# Patient Record
Sex: FEMALE | Race: WHITE | NOT HISPANIC OR LATINO | Employment: FULL TIME | URBAN - METROPOLITAN AREA
[De-identification: names, ages, dates, MRNs, and addresses within clinical notes are randomized per-mention and may not be internally consistent; named-entity substitution may affect disease eponyms.]

---

## 2022-01-04 ENCOUNTER — TRANSFERRED RECORDS (OUTPATIENT)
Dept: MULTI SPECIALTY CLINIC | Facility: CLINIC | Age: 27
End: 2022-01-04

## 2022-01-04 LAB — PAP-ABSTRACT: NORMAL

## 2022-06-20 ENCOUNTER — OFFICE VISIT (OUTPATIENT)
Dept: FAMILY MEDICINE | Facility: OTHER | Age: 27
End: 2022-06-20

## 2022-06-20 VITALS
BODY MASS INDEX: 30.26 KG/M2 | RESPIRATION RATE: 16 BRPM | HEART RATE: 84 BPM | SYSTOLIC BLOOD PRESSURE: 110 MMHG | TEMPERATURE: 97.5 F | HEIGHT: 63 IN | WEIGHT: 170.8 LBS | OXYGEN SATURATION: 97 % | DIASTOLIC BLOOD PRESSURE: 70 MMHG

## 2022-06-20 DIAGNOSIS — K52.9 GASTROENTERITIS: Primary | ICD-10-CM

## 2022-06-20 PROCEDURE — 99202 OFFICE O/P NEW SF 15 MIN: CPT | Performed by: STUDENT IN AN ORGANIZED HEALTH CARE EDUCATION/TRAINING PROGRAM

## 2022-06-20 ASSESSMENT — PAIN SCALES - GENERAL: PAINLEVEL: NO PAIN (0)

## 2022-06-20 NOTE — LETTER
[facility]  [address]  [place] [address]  Phone: [phone]    June 20, 2022        Kayy Jenkins  [address]   [place] [address]          To whom it may concern:    RE: Kayy Jenkins    Patient was seen today at our clinic. She may be excused from missing work on the dates of 6/14/2022 to 6/17/2022. Patient may return to work Monday, 6/20/2022 with no restrictions.    Please contact me for questions or concerns.      Sincerely,        ALINE RODRÍGUEZ MD

## 2022-06-20 NOTE — LETTER
[facility]  [address]  [place] [address]  Phone: [phone]    June 20, 2022        Kayy Jenkins  [address]   [place] [address]          To whom it may concern:    RE: Kayy Jenkins    Patient was seen today at our clinic. She may be excuse from missing work on the dates of 6/14/2022 to 6/17/2022. Patient may return to work Monday, 6/20/2022 with no restrictions.    Please contact me for questions or concerns.      Sincerely,        ALINE RODRÍGUEZ MD

## 2022-06-20 NOTE — PROGRESS NOTES
"  Assessment & Plan       ICD-10-CM    1. Gastroenteritis  K52.9      1.  Patient is a 27-year-old female presents for a work note as she missed work on 6/14/2022 to 6/17/2022 for gastroenteritis.  Her symptoms of abdominal cramping, diarrhea, diminished appetite, and headaches have improved.  She is requesting to go back to work today.  Today she appears well, has been tolerating food/fluids, and is afebrile.  Discussed food safety and symptoms that would warrant the patient to return.  Work note was provided to the patient.  Patient understood and verbally consented to the treatment plan.  Notify provider if questions or concerns arise.    BMI:   Estimated body mass index is 30.08 kg/m  as calculated from the following:    Height as of this encounter: 1.605 m (5' 3.19\").    Weight as of this encounter: 77.5 kg (170 lb 12.8 oz).     Follow-up if symptoms return or if new symptoms arise.    ALINE RODRÍGUEZ MD  [facility]    GiselaEVELINE Cool-S[address]  [facility]   Kayy is a 27 year old, presenting for the following health issues:  Note for return to work     Patient presents for a follow-up on her food poisoning illness and would like a note to return to work.  She is feeling well today with no fevers, cramping, headache, or diarrhea.  She has been staying well-hydrated with water and Gatorade and is tolerating food and fluids well.  On Monday 6/13 patient ate a burger for dinner that was undercooked.  She woke up with nausea, diarrhea, and an episode of vomiting on Tuesday 6/14 and called into work that day.  She had no appetite on Monday and Tuesday.  On Wednesday 6/15 she continued to have persistent watery diarrhea, intermittent abdominal cramping, and a migraine.  She checked her temperature and had no fevers during her illness.  On Thursday 6/16 she reports that she started to feel better and her symptoms decreased and fully resolved.  She has recovered from her " "food poisoning illness and is requesting a work note to return to work today.    History of Present Illness       Reason for visit:  General check up and doctors note    She eats 0-1 servings of fruits and vegetables daily.She consumes 1 sweetened beverage(s) daily.She exercises with enough effort to increase her heart rate 10 to 19 minutes per day.  She exercises with enough effort to increase her heart rate 4 days per week.   She is taking medications regularly.       Food poisoning, Needs doctors note sayings she is good to go back to work.    Review of Systems   Constitutional, HEENT, cardiovascular, pulmonary, gi and gu systems are negative, except as otherwise noted.      Objective    /70 (Cuff Size: Adult Regular)   Pulse 84   Temp 97.5  F (36.4  C) (Temporal)   Resp 16   Ht 1.605 m (5' 3.19\")   Wt 77.5 kg (170 lb 12.8 oz)   LMP 06/11/2022 (Approximate)   SpO2 97%   BMI 30.08 kg/m    Body mass index is 30.08 kg/m .  Physical Exam   GENERAL: healthy, alert and no distress  EYES: Eyes grossly normal to inspection  MS: no gross musculoskeletal defects noted, no edema  SKIN: no suspicious lesions or rashes  PSYCH: mentation appears normal, affect normal/bright              .  ..  "

## 2023-03-09 ENCOUNTER — OFFICE VISIT (OUTPATIENT)
Dept: FAMILY MEDICINE | Facility: OTHER | Age: 28
End: 2023-03-09
Payer: COMMERCIAL

## 2023-03-09 VITALS
DIASTOLIC BLOOD PRESSURE: 70 MMHG | SYSTOLIC BLOOD PRESSURE: 120 MMHG | WEIGHT: 170 LBS | HEIGHT: 64 IN | OXYGEN SATURATION: 97 % | HEART RATE: 89 BPM | BODY MASS INDEX: 29.02 KG/M2 | TEMPERATURE: 97.2 F | RESPIRATION RATE: 22 BRPM

## 2023-03-09 DIAGNOSIS — R21 RASH AND NONSPECIFIC SKIN ERUPTION: Primary | ICD-10-CM

## 2023-03-09 PROCEDURE — 99213 OFFICE O/P EST LOW 20 MIN: CPT | Performed by: PHYSICIAN ASSISTANT

## 2023-03-09 RX ORDER — RIZATRIPTAN BENZOATE 10 MG/1
TABLET, ORALLY DISINTEGRATING ORAL
COMMUNITY
Start: 2022-07-01 | End: 2023-03-09

## 2023-03-09 RX ORDER — TRIAMCINOLONE ACETONIDE 1 MG/G
CREAM TOPICAL 2 TIMES DAILY
Qty: 30 G | Refills: 0 | Status: SHIPPED | OUTPATIENT
Start: 2023-03-09 | End: 2023-04-27

## 2023-03-09 RX ORDER — AMITRIPTYLINE HYDROCHLORIDE 10 MG/1
TABLET ORAL
COMMUNITY
Start: 2022-03-29 | End: 2023-03-09

## 2023-03-09 RX ORDER — TOPIRAMATE 25 MG/1
1 TABLET, FILM COATED ORAL
COMMUNITY
Start: 2022-09-26 | End: 2023-03-09

## 2023-03-09 ASSESSMENT — PAIN SCALES - GENERAL: PAINLEVEL: WORST PAIN (10)

## 2023-03-09 NOTE — PROGRESS NOTES
"  Assessment & Plan     Rash and nonspecific skin eruption  Patient has pruritic papular lesion along the lateral right breast. 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 I discussed good skin cares with the patient and will have her use triamcinolone up to twice daily as needed for next 1-2 weeks. Reference material attached to the AVS.   - triamcinolone (KENALOG) 0.1 % external cream; Apply topically 2 times daily    Return in about 4 months (around 7/9/2023) for Return for scheduled annual checkup with PCP.    JOCY Morejon [facility] ENEDELIA Sultana is a 27 year old, presenting for the following health issues:  Breast Problem (Bumps and itching)      History of Present Illness       Reason for visit:  Bumps/itchy breasts  Symptom onset:  3-4 weeks ago  Symptoms include:  Itching  Symptom intensity:  Moderate  Symptom progression:  Staying the same  Had these symptoms before:  No    She eats 2-3 servings of fruits and vegetables daily.She consumes 1 sweetened beverage(s) daily.She exercises with enough effort to increase her heart rate 20 to 29 minutes per day.  She exercises with enough effort to increase her heart rate 5 days per week.   She is taking medications regularly.     Review of Systems   Constitutional, HEENT, cardiovascular, pulmonary, gi and gu systems are negative, except as otherwise noted.      Objective    /70 (BP Location: Left arm, Patient Position: Sitting, Cuff Size: Adult Regular)   Pulse 89   Temp 97.2  F (36.2  C) (Temporal)   Resp 22   Ht 5' 3.9\" (1.623 m)   Wt 170 lb (77.1 kg)   SpO2 97%   BMI 29.27 kg/m    Body mass index is 29.27 kg/m .  Physical Exam   GENERAL: healthy, alert and no distress  RESP: lungs clear to auscultation - no rales, rhonchi or wheezes  BREAST: normal without masses, tenderness or nipple discharge, 1-2mm " erythematous papular lesion along the right lateral breast  CV: regular rate and rhythm, normal S1 S2, no S3 or S4, no murmur, click or rub, no peripheral edema and peripheral pulses strong  PSYCH: mentation appears normal, affect normal/bright        MA Nelly was present in room during all sensitive exams.

## 2023-04-27 ENCOUNTER — OFFICE VISIT (OUTPATIENT)
Dept: FAMILY MEDICINE | Facility: OTHER | Age: 28
End: 2023-04-27
Payer: COMMERCIAL

## 2023-04-27 VITALS
HEART RATE: 78 BPM | DIASTOLIC BLOOD PRESSURE: 74 MMHG | WEIGHT: 201 LBS | TEMPERATURE: 97.9 F | OXYGEN SATURATION: 96 % | HEIGHT: 62 IN | BODY MASS INDEX: 36.99 KG/M2 | RESPIRATION RATE: 22 BRPM | SYSTOLIC BLOOD PRESSURE: 116 MMHG

## 2023-04-27 DIAGNOSIS — Z00.00 ROUTINE GENERAL MEDICAL EXAMINATION AT A HEALTH CARE FACILITY: Primary | ICD-10-CM

## 2023-04-27 DIAGNOSIS — R07.89 ATYPICAL CHEST PAIN: ICD-10-CM

## 2023-04-27 DIAGNOSIS — F32.2 CURRENT SEVERE EPISODE OF MAJOR DEPRESSIVE DISORDER WITHOUT PSYCHOTIC FEATURES, UNSPECIFIED WHETHER RECURRENT (H): ICD-10-CM

## 2023-04-27 DIAGNOSIS — K21.9 GASTROESOPHAGEAL REFLUX DISEASE WITHOUT ESOPHAGITIS: ICD-10-CM

## 2023-04-27 DIAGNOSIS — R40.0 DAYTIME SLEEPINESS: ICD-10-CM

## 2023-04-27 DIAGNOSIS — R35.0 URINARY FREQUENCY: ICD-10-CM

## 2023-04-27 DIAGNOSIS — R45.851 SUICIDAL IDEATION: ICD-10-CM

## 2023-04-27 LAB
ALBUMIN UR-MCNC: NEGATIVE MG/DL
ANION GAP SERPL CALCULATED.3IONS-SCNC: 8 MMOL/L (ref 7–15)
APPEARANCE UR: CLEAR
BILIRUB UR QL STRIP: NEGATIVE
BUN SERPL-MCNC: 11.2 MG/DL (ref 6–20)
CALCIUM SERPL-MCNC: 9 MG/DL (ref 8.6–10)
CHLORIDE SERPL-SCNC: 105 MMOL/L (ref 98–107)
CLUE CELLS: PRESENT
COLOR UR AUTO: YELLOW
CREAT SERPL-MCNC: 0.63 MG/DL (ref 0.51–0.95)
DEPRECATED HCO3 PLAS-SCNC: 27 MMOL/L (ref 22–29)
ERYTHROCYTE [DISTWIDTH] IN BLOOD BY AUTOMATED COUNT: 13.7 % (ref 10–15)
GFR SERPL CREATININE-BSD FRML MDRD: >90 ML/MIN/1.73M2
GLUCOSE SERPL-MCNC: 112 MG/DL (ref 70–99)
GLUCOSE UR STRIP-MCNC: NEGATIVE MG/DL
HCT VFR BLD AUTO: 39 % (ref 35–47)
HGB BLD-MCNC: 13.1 G/DL (ref 11.7–15.7)
HGB UR QL STRIP: NEGATIVE
KETONES UR STRIP-MCNC: NEGATIVE MG/DL
LEUKOCYTE ESTERASE UR QL STRIP: NEGATIVE
MCH RBC QN AUTO: 30.4 PG (ref 26.5–33)
MCHC RBC AUTO-ENTMCNC: 33.6 G/DL (ref 31.5–36.5)
MCV RBC AUTO: 91 FL (ref 78–100)
NITRATE UR QL: NEGATIVE
PH UR STRIP: 7.5 [PH] (ref 5–7)
PLATELET # BLD AUTO: 305 10E3/UL (ref 150–450)
POTASSIUM SERPL-SCNC: 4.2 MMOL/L (ref 3.4–5.3)
RBC # BLD AUTO: 4.31 10E6/UL (ref 3.8–5.2)
SODIUM SERPL-SCNC: 140 MMOL/L (ref 136–145)
SP GR UR STRIP: 1.02 (ref 1–1.03)
TRICHOMONAS, WET PREP: ABNORMAL
TSH SERPL DL<=0.005 MIU/L-ACNC: 2.17 UIU/ML (ref 0.3–4.2)
UROBILINOGEN UR STRIP-ACNC: 0.2 E.U./DL
WBC # BLD AUTO: 6.7 10E3/UL (ref 4–11)
WBC'S/HIGH POWER FIELD, WET PREP: ABNORMAL
YEAST, WET PREP: ABNORMAL

## 2023-04-27 PROCEDURE — 87210 SMEAR WET MOUNT SALINE/INK: CPT | Performed by: PHYSICIAN ASSISTANT

## 2023-04-27 PROCEDURE — 99395 PREV VISIT EST AGE 18-39: CPT | Performed by: PHYSICIAN ASSISTANT

## 2023-04-27 PROCEDURE — 99214 OFFICE O/P EST MOD 30 MIN: CPT | Mod: 25 | Performed by: PHYSICIAN ASSISTANT

## 2023-04-27 PROCEDURE — 36415 COLL VENOUS BLD VENIPUNCTURE: CPT | Performed by: PHYSICIAN ASSISTANT

## 2023-04-27 PROCEDURE — 80048 BASIC METABOLIC PNL TOTAL CA: CPT | Performed by: PHYSICIAN ASSISTANT

## 2023-04-27 PROCEDURE — 81003 URINALYSIS AUTO W/O SCOPE: CPT | Performed by: PHYSICIAN ASSISTANT

## 2023-04-27 PROCEDURE — 84443 ASSAY THYROID STIM HORMONE: CPT | Performed by: PHYSICIAN ASSISTANT

## 2023-04-27 PROCEDURE — 85027 COMPLETE CBC AUTOMATED: CPT | Performed by: PHYSICIAN ASSISTANT

## 2023-04-27 ASSESSMENT — ENCOUNTER SYMPTOMS
NERVOUS/ANXIOUS: 1
FEVER: 0
DIZZINESS: 1
DIARRHEA: 0
ARTHRALGIAS: 0
ABDOMINAL PAIN: 0
JOINT SWELLING: 0
MYALGIAS: 0
NAUSEA: 1
PALPITATIONS: 0
SHORTNESS OF BREATH: 0
SORE THROAT: 0
DYSURIA: 0
HEARTBURN: 1
COUGH: 0
CONSTIPATION: 0
PARESTHESIAS: 0
FREQUENCY: 1
HEADACHES: 1
WEAKNESS: 1
EYE PAIN: 0
HEMATOCHEZIA: 0
HEMATURIA: 0
CHILLS: 0

## 2023-04-27 ASSESSMENT — PATIENT HEALTH QUESTIONNAIRE - PHQ9
SUM OF ALL RESPONSES TO PHQ QUESTIONS 1-9: 20
SUM OF ALL RESPONSES TO PHQ QUESTIONS 1-9: 20
10. IF YOU CHECKED OFF ANY PROBLEMS, HOW DIFFICULT HAVE THESE PROBLEMS MADE IT FOR YOU TO DO YOUR WORK, TAKE CARE OF THINGS AT HOME, OR GET ALONG WITH OTHER PEOPLE: NOT DIFFICULT AT ALL

## 2023-04-27 ASSESSMENT — PAIN SCALES - GENERAL: PAINLEVEL: SEVERE PAIN (6)

## 2023-04-27 NOTE — PATIENT INSTRUCTIONS
Preventive Health Recommendations  Female Ages 26 - 39  Yearly exam:   See your health care provider every year in order to  Review health changes.   Discuss preventive care.    Review your medicines if you your doctor has prescribed any.    Until age 30: Get a Pap test every three years (more often if you have had an abnormal result).    After age 30: Talk to your doctor about whether you should have a Pap test every 3 years or have a Pap test with HPV screening every 5 years.   You do not need a Pap test if your uterus was removed (hysterectomy) and you have not had cancer.  You should be tested each year for STDs (sexually transmitted diseases), if you're at risk.   Talk to your provider about how often to have your cholesterol checked.  If you are at risk for diabetes, you should have a diabetes test (fasting glucose).  Shots: Get a flu shot each year. Get a tetanus shot every 10 years.   Nutrition:   Eat at least 5 servings of fruits and vegetables each day.  Eat whole-grain bread, whole-wheat pasta and brown rice instead of white grains and rice.  Get adequate Calcium and Vitamin D.     Lifestyle  Exercise at least 150 minutes a week (30 minutes a day, 5 days of the week). This will help you control your weight and prevent disease.  Limit alcohol to one drink per day.  No smoking.   Wear sunscreen to prevent skin cancer.  See your dentist every six months for an exam and cleaning.    Medications which could help with headaches  Effexor  Topamax  Propranolol    Medications which help with headaches and mental health  Effexor (+)   Fluoxetine (+/-)  Duloxetine (+/-)

## 2023-04-27 NOTE — PROGRESS NOTES
SUBJECTIVE:   CC: Kayy is an 28 year old who presents for preventive health visit.       4/27/2023     3:55 PM   Additional Questions   Roomed by Sherron VARNER   Accompanied by self   Patient has been advised of split billing requirements and indicates understanding: Yes  Healthy Habits:     Getting at least 3 servings of Calcium per day:  Yes    Bi-annual eye exam:  NO    Dental care twice a year:  Yes    Sleep apnea or symptoms of sleep apnea:  Daytime drowsiness    Diet:  Regular (no restrictions)    Frequency of exercise:  2-3 days/week    Duration of exercise:  Less than 15 minutes    Taking medications regularly:  Yes    Medication side effects:  Not applicable    PHQ-2 Total Score: 6    Additional concerns today:  No    (Z00.00) Routine general medical examination at a health care facility  (primary encounter diagnosis)  Comment: Patient arrived with several additional concerns which she would like to address today. Reviewed health maintenance.   Plan: Basic metabolic panel  (Ca, Cl, CO2, Creat,         Gluc, K, Na, BUN), CBC with platelets, TSH with        free T4 reflex    (R07.89) Atypical chest pain  (K21.9) Gastroesophageal reflux disease without esophagitis  Comment: Patient reports sporadic, brief chest pains which occur at night while laying or at rest. She has a history of reflux which is managed with OTC prilosec and avoiding trigger foods such as greasy and/or tomato based foods. Exam unremarkable today.   Plan: Patient given education on causes for atypical chest pains including reflux as well as alarm symptoms to monitor for. She will seek emergent care if these occurs.     (R35.0) Urinary frequency  Comment: Labs returned positive for clue cells. Patient will be started on oral flagyl. She will reach out if not improving as expected.   Plan: UA Macroscopic with reflex to Microscopic and         Culture, Wet prep - lab collect, TSH with free         T4 reflex    (R40.0) Daytime  sleepiness  Comment: Patient reports daytime sleepiness for some time. She has received feedback from several family members that she should undergo sleep study. Her dentist recently provided her similar feedback. She is agreeable to scheduling this now. Order placed.   Plan: Adult Sleep Eval & Management          Referral          (F32.2) Current severe episode of major depressive disorder without psychotic features, unspecified whether recurrent (H)  (R48.455) Suicidal ideation  Comment: She reports severe depression with thoughts of wanting to hurt herself off/on. She denies urges to act on these thoughts and cites family and friends as motivation to stay safe. Patient informs me that she has struggled with mental health for several years. She recalls that she had taken medication for this in the past and did not tolerate these medications. We discussed trying alternatives such as Prozac. Reviewed the MOA, adverse effects and timeframe for benefit. She was not interested in a treatment such as this today. She would prefer to meet with a counselor instead. If this is too costly we can revisit medication. Depression action plan reviewed with the patient and copy sent home with AVS.   Plan: TSH with free T4 reflex, Adult Sleep Eval &         Management  Referral, Adult Mental         Health  Referral  Plan: Adult Mental Health  Referral            Answers for HPI/ROS submitted by the patient on 4/27/2023  If you checked off any problems, how difficult have these problems made it for you to do your work, take care of things at home, or get along with other people?: Not difficult at all  PHQ9 TOTAL SCORE: 20    Today's PHQ-2 Score:       4/27/2023     3:41 PM   PHQ-2 ( 1999 [employer])   Q1: Little interest or pleasure in doing things 3   Q2: Feeling down, depressed or hopeless 3   PHQ-2 Score 6   Q1: Little interest or pleasure in doing things Nearly every day   Q2: Feeling down,  Vascular & Interventional Radiology Post-Procedure Note    Pre-Procedure Diagnosis: Pelvic collection  Post-Procedure Diagnosis: Same as pre.  Indications for Procedure: Culture and source control    Attending: Dr. Goncalves  Resident: Dr. Luna    Procedure Details/Findings: Successful 10.2 Fr drainage catheter placement within a pelvic collection  Access (if applicable): NA    Complications: None  Estimated Blood Loss: Minimal  Specimen: 45 cc purulent yellowish fluid  Contrast: None  Sedation: IV sedation by anesthesiology  Patient Condition/Disposition: Stable/Recovery then to floor    Plan:  1.) Keep drain to bulb suction  2.) Monitor drain output  3.) Flush catheter with 5 cc normal saline once daily  4.) Follow-up culture  5.) Keep dressing clean and dry depressed or hopeless Nearly every day   PHQ-2 Score 6       Have you ever done Advance Care Planning? (For example, a Health Directive, POLST, or a discussion with a medical provider or your loved ones about your wishes): No, advance care planning information given to patient to review.  Advanced care planning was discussed at today's visit.    Social History     Tobacco Use     Smoking status: Never     Smokeless tobacco: Never   Vaping Use     Vaping status: Never Used   Substance Use Topics     Alcohol use: Yes         4/27/2023     3:41 PM   Alcohol Use   Prescreen: >3 drinks/day or >7 drinks/week? No     Reviewed orders with patient.  Reviewed health maintenance and updated orders accordingly - Yes  Lab work is in process    Breast Cancer Screening:    FHS-7:       4/27/2023     3:41 PM   Breast CA Risk Assessment (FHS-7)   Did any of your first-degree relatives have breast or ovarian cancer? Yes   Did any of your relatives have bilateral breast cancer? Unknown   Did any man in your family have breast cancer? Unknown   Did any woman in your family have breast and ovarian cancer? Yes   Did any woman in your family have breast cancer before age 50 y? Unknown   Do you have 2 or more relatives with breast and/or ovarian cancer? Unknown   Do you have 2 or more relatives with breast and/or bowel cancer? Unknown       Patient under 40 years of age: Routine Mammogram Screening not recommended.   Pertinent mammograms are reviewed under the imaging tab.    History of abnormal Pap smear: Discussed pap today. Patient would prefer to wait until next annual checkup and plans to see a female provider.     Reviewed and updated as needed this visit by clinical staff   Tobacco  Allergies  Meds              Reviewed and updated as needed this visit by Provider      Review of Systems   Constitutional: Negative for chills and fever.   HENT: Positive for congestion. Negative for ear pain, hearing loss and sore throat.    Eyes:  "Negative for pain and visual disturbance.   Respiratory: Negative for cough and shortness of breath.    Cardiovascular: Positive for chest pain. Negative for palpitations and peripheral edema.   Gastrointestinal: Positive for heartburn and nausea. Negative for abdominal pain, constipation, diarrhea and hematochezia.   Genitourinary: Positive for frequency and urgency. Negative for dysuria, genital sores and hematuria.   Musculoskeletal: Negative for arthralgias, joint swelling and myalgias.   Skin: Negative for rash.   Neurological: Positive for dizziness, weakness and headaches. Negative for paresthesias.   Psychiatric/Behavioral: Negative for mood changes. The patient is nervous/anxious.       OBJECTIVE:   /74   Pulse 78   Temp 97.9  F (36.6  C) (Temporal)   Resp 22   Ht 1.585 m (5' 2.4\")   Wt 91.2 kg (201 lb)   LMP 04/17/2023   SpO2 96%   BMI 36.29 kg/m    Physical Exam  GENERAL: healthy, alert and no distress  EYES: Eyes grossly normal to inspection, PERRL and conjunctivae and sclerae normal  HENT: ear canals and TM's normal, nose and mouth without ulcers or lesions  NECK: no adenopathy, no asymmetry, masses, or scars and thyroid normal to palpation  RESP: lungs clear to auscultation - no rales, rhonchi or wheezes  CV: regular rate and rhythm, normal S1 S2, no S3 or S4, no murmur, click or rub, no peripheral edema and peripheral pulses strong  ABDOMEN: soft, nontender, no hepatosplenomegaly, no masses and bowel sounds normal  MS: no gross musculoskeletal defects noted, no edema  NEURO: Normal strength and tone, mentation intact and speech normal  PSYCH: mentation appears normal, affect normal/bright    Diagnostic Test Results:  Results for orders placed or performed in visit on 04/27/23 (from the past 24 hour(s))   CBC with platelets   Result Value Ref Range    WBC Count 6.7 4.0 - 11.0 10e3/uL    RBC Count 4.31 3.80 - 5.20 10e6/uL    Hemoglobin 13.1 11.7 - 15.7 g/dL    Hematocrit 39.0 35.0 - 47.0 % "    MCV 91 78 - 100 fL    MCH 30.4 26.5 - 33.0 pg    MCHC 33.6 31.5 - 36.5 g/dL    RDW 13.7 10.0 - 15.0 %    Platelet Count 305 150 - 450 10e3/uL   Wet prep - lab collect    Specimen: Vagina; Swab   Result Value Ref Range    Trichomonas Absent Absent    Yeast Absent Absent    Clue Cells Present (A) Absent    WBCs/high power field 1+ (A) None   UA Macroscopic with reflex to Microscopic and Culture    Specimen: Urine, Clean Catch   Result Value Ref Range    Color Urine Yellow Colorless, Straw, Light Yellow, Yellow    Appearance Urine Clear Clear    Glucose Urine Negative Negative mg/dL    Bilirubin Urine Negative Negative    Ketones Urine Negative Negative mg/dL    Specific Gravity Urine 1.020 1.003 - 1.035    Blood Urine Negative Negative    pH Urine 7.5 (H) 5.0 - 7.0    Protein Albumin Urine Negative Negative mg/dL    Urobilinogen Urine 0.2 0.2, 1.0 E.U./dL    Nitrite Urine Negative Negative    Leukocyte Esterase Urine Negative Negative    Narrative    Microscopic not indicated       ASSESSMENT/PLAN:       ICD-10-CM    1. Routine general medical examination at a health care facility  Z00.00 Basic metabolic panel  (Ca, Cl, CO2, Creat, Gluc, K, Na, BUN)     CBC with platelets     TSH with free T4 reflex     TSH with free T4 reflex     CBC with platelets     Basic metabolic panel  (Ca, Cl, CO2, Creat, Gluc, K, Na, BUN)      2. Atypical chest pain  R07.89       3. Gastroesophageal reflux disease without esophagitis  K21.9       4. Urinary frequency  R35.0 UA Macroscopic with reflex to Microscopic and Culture     Wet prep - lab collect     TSH with free T4 reflex     TSH with free T4 reflex     Wet prep - lab collect     UA Macroscopic with reflex to Microscopic and Culture      5. Daytime sleepiness  R40.0 Adult Sleep Eval & Management  Referral      6. Current severe episode of major depressive disorder without psychotic features, unspecified whether recurrent (H)  F32.2 TSH with free T4 reflex     Adult Sleep  "Eval & Management  Referral     Adult Mental Health  Referral     TSH with free T4 reflex      7. Suicidal ideation  R45.851 Adult Mental Health  Referral          Patient has been advised of split billing requirements and indicates understanding: Yes      COUNSELING:  Reviewed preventive health counseling, as reflected in patient instructions       Regular exercise       Healthy diet/nutrition       Alcohol Use       Pap screen - patient declined today. I instructed her to schedule with female provider for next annual checkup to have this done.       BMI:   Estimated body mass index is 36.29 kg/m  as calculated from the following:    Height as of this encounter: 1.585 m (5' 2.4\").    Weight as of this encounter: 91.2 kg (201 lb).   Weight management plan: Discussed healthy diet and exercise guidelines      She reports that she has never smoked. She has never used smokeless tobacco.    Rolando Jain PA-C  [facility]        "

## 2023-04-27 NOTE — LETTER
My Depression Action Plan  Name: Kayy Jenkins   Date of Birth 1995  Date: 4/27/2023    My doctor: Rolando Jain   My clinic: [address]  [facility] [address]  [phone]            GREEN    ZONE   Good Control    What it looks like:   Things are going generally well. You have normal ups and downs. You may even feel depressed from time to time, but bad moods usually last less than a day.   What you need to do:  Continue to care for yourself (see self care plan)  Check your depression survival kit and update it as needed  Follow your physician s recommendations including any medication.  Do not stop taking medication unless you consult with your physician first.             YELLOW         ZONE Getting Worse    What it looks like:   Depression is starting to interfere with your life.   It may be hard to get out of bed; you may be starting to isolate yourself from others.  Symptoms of depression are starting to last most all day and this has happened for several days.   You may have suicidal thoughts but they are not constant.   What you need to do:     Call your care team. Your response to treatment will improve if you keep your care team informed of your progress. Yellow periods are signs an adjustment may need to be made.     Continue your self-care.  Just get dressed and ready for the day.  Don't give yourself time to talk yourself out of it.    Talk to someone in your support network.    Open up your Depression Self-Care Plan/Wellness Kit.             RED    ZONE Medical Alert - Get Help    What it looks like:   Depression is seriously interfering with your life.   You may experience these or other symptoms: You can t get out of bed most days, can t work or engage in other necessary activities, you have trouble taking care of basic hygiene, or basic responsibilities, thoughts of suicide or death that will not go away,  self-injurious behavior.     What you need to do:  Call your care team and request a same-day appointment. If they are not available (weekends or after hours) call your local crisis line, emergency room or 911.          Depression Self-Care Plan / Wellness Kit    Many people find that medication and therapy are helpful treatments for managing depression. In addition, making small changes to your everyday life can help to boost your mood and improve your wellbeing. Below are some tips for you to consider. Be sure to talk with your medical provider and/or behavioral health consultant if your symptoms are worsening or not improving.     Sleep   Sleep hygiene  means all of the habits that support good, restful sleep. It includes maintaining a consistent bedtime and wake time, using your bedroom only for sleeping or sex, and keeping the bedroom dark and free of distractions like a computer, smartphone, or television.     Develop a Healthy Routine  Maintain good hygiene. Get out of bed in the morning, make your bed, brush your teeth, take a shower, and get dressed. Don t spend too much time viewing media that makes you feel stressed. Find time to relax each day.    Exercise  Get some form of exercise every day. This will help reduce pain and release endorphins, the  feel good  chemicals in your brain. It can be as simple as just going for a walk or doing some gardening, anything that will get you moving.      Diet  Strive to eat healthy foods, including fruits and vegetables. Drink plenty of water. Avoid excessive sugar, caffeine, alcohol, and other mood-altering substances.     Stay Connected with Others  Stay in touch with friends and family members.    Manage Your Mood  Try deep breathing, massage therapy, biofeedback, or meditation. Take part in fun activities when you can. Try to find something to smile about each day.     Psychotherapy  Be open to working with a therapist if your provider recommends it.      Medication  Be sure to take your medication as prescribed. Most anti-depressants need to be taken every day. It usually takes several weeks for medications to work. Not all medicines work for all people. It is important to follow-up with your provider to make sure you have a treatment plan that is working for you. Do not stop your medication abruptly without first discussing it with your provider.    Crisis Resources   These hotlines are for both adults and children. They and are open 24 hours a day, 7 days a week unless noted otherwise.    National Suicide Prevention Lifeline   988 or 1-007-563-APZN (3076)    Crisis Text Line    www.crisistextline.org  Text HOME to 544244 from anywhere in the [place], anytime, about any type of crisis. A live, trained crisis counselor will receive the text and respond quickly.    [facility] Lifeline for LGBTQ Youth  A national crisis intervention and suicide lifeline for LGBTQ youth under 25. Provides a safe place to talk without judgement. Call 1-534.853.6507; text START to 608341 or visit www.thetrevorproject.org to talk to a trained counselor.    For [facility] crisis numbers, visit the [facility] website at:  https://[place].gov/dhs/people-we-serve/adults/health-care/mental-health/resources/crisis-contacts.jsp

## 2023-04-28 ENCOUNTER — TELEPHONE (OUTPATIENT)
Dept: FAMILY MEDICINE | Facility: OTHER | Age: 28
End: 2023-04-28
Payer: COMMERCIAL

## 2023-04-28 DIAGNOSIS — N76.0 BV (BACTERIAL VAGINOSIS): Primary | ICD-10-CM

## 2023-04-28 DIAGNOSIS — B96.89 BV (BACTERIAL VAGINOSIS): Primary | ICD-10-CM

## 2023-04-28 RX ORDER — METRONIDAZOLE 500 MG/1
500 TABLET ORAL 2 TIMES DAILY
Qty: 14 TABLET | Refills: 0 | Status: SHIPPED | OUTPATIENT
Start: 2023-04-28 | End: 2023-05-05

## 2023-04-28 NOTE — TELEPHONE ENCOUNTER
Kayy calling and asking about her lab results.  Did read to her the message written by Rolando Jain on her lab results.    Let her know her script was sent to her pharmacy.    She also asked for the number to deal with mychart so number was given.    Petra Hoff RN  [facility] ~ Registered Nurse  Clinic Triage ~ [facility]  April 28, 2023

## 2023-04-28 NOTE — TELEPHONE ENCOUNTER
----- Message from Rolando Jain PA-C sent at 4/28/2023 11:00 AM CDT -----  Please call patient to inform her that the wet prep did return positive for bacterial vaginosis. I have sent out an antibiotic for her to use twice daily for the next week. This antibiotic is processed through the liver, so she should avoid alcohol consumption while taking it. It can also cause a metallic taste in the mouth which, if it occurs, goes away after course is completed. It can also increased the skin's sensitivity to the sun, so she should wear sunscreen on ignacia days.     The metabolic panel, which evaluates your kidney function, electrolytes and blood sugar, returned grossly normal. The thyroid was within normal range. The cell counts did not show any evidence of anemia, infection or other abnormality. She can continue with the plan to schedule with our counseling service. If she would like to start a medication she can reach out to let me know.    Rolando Jain PA-C on 4/28/2023 at 11:00 AM

## 2023-04-28 NOTE — RESULT ENCOUNTER NOTE
Please call patient to inform her that the wet prep did return positive for bacterial vaginosis. I have sent out an antibiotic for her to use twice daily for the next week. This antibiotic is processed through the liver, so she should avoid alcohol consumption while taking it. It can also cause a metallic taste in the mouth which, if it occurs, goes away after course is completed. It can also increased the skin's sensitivity to the sun, so she should wear sunscreen on ignacia days.     The metabolic panel, which evaluates your kidney function, electrolytes and blood sugar, returned grossly normal. The thyroid was within normal range. The cell counts did not show any evidence of anemia, infection or other abnormality. She can continue with the plan to schedule with our counseling service. If she would like to start a medication she can reach out to let me know.    Rolando Jain PA-C on 4/28/2023 at 11:00 AM

## 2023-04-28 NOTE — TELEPHONE ENCOUNTER
Attempted to call patient with the following message below. Left a voicemail for the patient to call the clinic back.    Corrine Caba MA

## 2023-05-09 ENCOUNTER — TELEPHONE (OUTPATIENT)
Dept: FAMILY MEDICINE | Facility: OTHER | Age: 28
End: 2023-05-09
Payer: COMMERCIAL

## 2023-05-09 NOTE — TELEPHONE ENCOUNTER
General Call      Reason for Call:  Question about referral    What are your questions or concerns:    Patient got referred to mental health within the [facility] but patient would like to know if she can be referred somewhere else in [place]. [facility] only wants to go there if primary doctor will still work with her and her therapist out of the [facility] system      Date of last appointment with provider: n/a    Okay to leave a detailed message?: Yes at Cell number on file:    Telephone Information:   Mobile [phone]

## 2023-05-11 ENCOUNTER — NURSE TRIAGE (OUTPATIENT)
Dept: NURSING | Facility: CLINIC | Age: 28
End: 2023-05-11
Payer: COMMERCIAL

## 2023-05-11 NOTE — TELEPHONE ENCOUNTER
Patient calling back after missing the call yesterday.  Writer julia transferred patient to Yovani Krishnamurthy RN.    Fang Devlin RN  [facility]

## 2023-05-11 NOTE — TELEPHONE ENCOUNTER
FYI:  Patient called back.  She is wanting GERALD Jain PA-C to know that she made an appointment within [facility] for behavioral health but it is in [place] which she states is too far to travel.  She checked with her insurance and was told that [facility] has a provider within her insurance network and she does not need a referral to see her; Jolanta Annie psychotherapist.  She is going to call and set up an appointment with her if she is taking new patients; if not, she will keep appointment in [facility] within [facility].  She just wanted to make sure she could still see you for her other health care needs if she was seen outside [facility] for behavioral health.  I assured her she can still be seen for her physicals, illnesses, etc with provider within [facility].  Rakel JOHNSON RN

## 2023-05-17 ENCOUNTER — HOSPITAL ENCOUNTER (OUTPATIENT)
Dept: BEHAVIORAL HEALTH | Facility: CLINIC | Age: 28
Discharge: HOME | End: 2023-05-17
Attending: FAMILY MEDICINE
Payer: COMMERCIAL

## 2023-05-17 DIAGNOSIS — F43.10 POSTTRAUMATIC STRESS DISORDER: ICD-10-CM

## 2023-05-17 DIAGNOSIS — F60.3 BORDERLINE PERSONALITY DISORDER (H): ICD-10-CM

## 2023-05-17 DIAGNOSIS — F33.41 RECURRENT MAJOR DEPRESSIVE DISORDER, IN PARTIAL REMISSION (H): ICD-10-CM

## 2023-05-17 DIAGNOSIS — F41.9 ANXIETY: ICD-10-CM

## 2023-05-17 PROBLEM — F32.9 MDD (MAJOR DEPRESSIVE DISORDER): Status: ACTIVE | Noted: 2023-05-17

## 2023-05-17 PROCEDURE — 999N000216 HC STATISTIC ADULT CD FACE TO FACE-NO CHRG: Performed by: COUNSELOR

## 2023-05-17 ASSESSMENT — COLUMBIA-SUICIDE SEVERITY RATING SCALE - C-SSRS
4. HAVE YOU HAD THESE THOUGHTS AND HAD SOME INTENTION OF ACTING ON THEM?: NO
5. HAVE YOU STARTED TO WORK OUT OR WORKED OUT THE DETAILS OF HOW TO KILL YOURSELF? DO YOU INTEND TO CARRY OUT THIS PLAN?: NO
1. IN THE PAST MONTH, HAVE YOU WISHED YOU WERE DEAD OR WISHED YOU COULD GO TO SLEEP AND NOT WAKE UP?: YES
6. HAVE YOU EVER DONE ANYTHING, STARTED TO DO ANYTHING, OR PREPARED TO DO ANYTHING TO END YOUR LIFE?: NO
3. HAVE YOU BEEN THINKING ABOUT HOW YOU MIGHT KILL YOURSELF?: YES
2. HAVE YOU ACTUALLY HAD ANY THOUGHTS OF KILLING YOURSELF IN THE PAST MONTH?: YES

## 2023-05-17 ASSESSMENT — ANXIETY QUESTIONNAIRES
GAD7 TOTAL SCORE: 3
7. FEELING AFRAID AS IF SOMETHING AWFUL MIGHT HAPPEN: NOT AT ALL
5. BEING SO RESTLESS THAT IT IS HARD TO SIT STILL: NOT AT ALL
4. TROUBLE RELAXING: NOT AT ALL
2. NOT BEING ABLE TO STOP OR CONTROL WORRYING: NOT AT ALL
GAD7 TOTAL SCORE: 3
3. WORRYING TOO MUCH ABOUT DIFFERENT THINGS: SEVERAL DAYS
6. BECOMING EASILY ANNOYED OR IRRITABLE: SEVERAL DAYS
1. FEELING NERVOUS, ANXIOUS, OR ON EDGE: SEVERAL DAYS

## 2023-05-17 ASSESSMENT — PATIENT HEALTH QUESTIONNAIRE - PHQ9: SUM OF ALL RESPONSES TO PHQ QUESTIONS 1-9: 3

## 2023-05-17 NOTE — PATIENT INSTRUCTIONS
"  Referral:   Juanjo Osullivan  Virtual Appointment  Date: 06/14/2023   Time: 8:00am   Provider: Kayy Castanon    Follow-up  Date: 06/21/2023  Time: 8:00am       Nicho Jaeger [facility],  May 17, 2023                         [facility] [place]    Outpatient Mental Health Services - Adult Safety Plan      PATIENT'S NAME: Kayy Jenkins  MRN:   [id number]    Step 1: Warning signs / cues (Thoughts, images, mood, situation, behavior) that a crisis may be developing:  Thoughts: \"I don't matter\", \"People would be better off without me\", \"I'm a burden\" and \"I can't do this anymore\"  Images: obsessive thoughts of death or dying and flashbacks  Thinking Processes: ruminations (can't stop thinking about my problems), racing thoughts and intrusive thoughts (bothersome, unwanted thoughts that come out of nowhere)  Mood: worsening depression, hopelessness, helplessness, agitation and mood swings  Behaviors: isolating/withdrawing  and impulsive, reckless behaviors (acting without thinking)  Situations: changes in symptoms     Step 2: Coping strategies - Things I can do to take my mind off of my problems without contacting another person (relaxation technique, physical activity):  Distress Tolerance Strategies:  arts and crafts, play with my pet  and watch a funny movie  Physical Activities: go for a walk  Focus on helpful thoughts:  \"This is temporary\", \"I will get through this\", \"It always passes\" and \"Ride the wave\"    Step 3: People and social settings that provide distraction:   Name: my job    music and coloring      Step 4: Remind myself of people and things that are important to me and worth living for:  Family, pets and  friends      Step 5: When I am in crisis, I can ask these people to help me use my safety plan:   Name: Hailee Jenkins (grandparent) Phone: [phone]              Name: Yannick Sheikh (signficant other)  Phone: [phone]       Step 6: Making " the environment safe:   remove things I could use to hurt myself and be around others    Step 7: Professionals or agencies I can contact during a crisis:    Suicide Prevention Lifeline: 9-767-422-TALK (3637)  Crisis Text Line Service (available 24 hours a day, 7 days a week): Text [place] to 128310  Call  **CRISIS (273156) from a cell phone to talk to a team of professionals who can help you.    Crisis Services By [facility]: Phone Number:   [place]     [phone]   [place]    [phone]   [facility]    [phone]   [place]    [phone]   [place]    [phone]   [place] [phone]   [place]     [phone]     Call 911 or go to my nearest emergency department.     I helped develop this safety plan and agree to use it when needed.  I have been given a copy of this plan.      Completed by Provider Name/ Credentials:  Nicho Jaeger, [facility]  Today s date:  5/17/2023    Kayy Jenkins waspchandlervided a copy of this Safety Plan.    Adapted from Safety Plan Template 2008 Niya Kidd and Delroy Frey is reprinted with the express permission of the authors.  No portion of the Safety Plan Template may be reproduced without the express, written permission.  You can contact the authors at [email or web address] or [email or web address]
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
## 2023-05-28 ENCOUNTER — MYC MEDICAL ADVICE (OUTPATIENT)
Dept: FAMILY MEDICINE | Facility: OTHER | Age: 28
End: 2023-05-28
Payer: COMMERCIAL

## 2023-05-30 NOTE — TELEPHONE ENCOUNTER
Patient returned call. RN read the message sent via [facility]. Informed the patient to schedule a visit. If her symptoms worsen then she needs to go into the emergency room.     NIECY Kauffman, RN, PHN  [place] [place]  May 30, 2023

## 2023-06-06 ENCOUNTER — VIRTUAL VISIT (OUTPATIENT)
Dept: FAMILY MEDICINE | Facility: OTHER | Age: 28
End: 2023-06-06
Payer: COMMERCIAL

## 2023-06-06 DIAGNOSIS — R35.0 URINARY FREQUENCY: Primary | ICD-10-CM

## 2023-06-06 DIAGNOSIS — G43.909 MIGRAINE WITHOUT STATUS MIGRAINOSUS, NOT INTRACTABLE, UNSPECIFIED MIGRAINE TYPE: ICD-10-CM

## 2023-06-06 PROCEDURE — 99214 OFFICE O/P EST MOD 30 MIN: CPT | Mod: VID | Performed by: PHYSICIAN ASSISTANT

## 2023-06-06 NOTE — LETTER
June 6, 2023      Kayy PUTNAM Post [address]   [facility] [address]        To Whom It May Concern,       Kayy was seen virtually today, June 6, 2023, for follow up on polyuria/frequent urination. The cause for her symptoms is currently under evaluation and she has been referred to specialist. While we are working out this issue for her, I would appreciate if she would be allowed extra restroom use during her shift, at least one use per hour.        Sincerely,        Rolando Jain PA-C

## 2023-06-06 NOTE — PROGRESS NOTES
Kayy is a 28 year old who is being evaluated via a billable video visit.      How would you like to obtain your AVS? MyChart  If the video visit is dropped, the invitation should be resent by: Text to cell phone: [phone]  Will anyone else be joining your video visit? No    Assessment & Plan     Urinary frequency  Migraine without status migrainosus, not intractable, unspecified migraine type  Patient has been struggling with urinary frequency for several months. She had mentioned this at the time of her previous annual preventative in April 2023. Review of the workup at that visit was positive for clue cells and patient was treated with flagyl. Unfortunately, symptoms have persisted and she is having to use restroom frequently at her place of employment to the extent that her employer has asked her to get a medical note. She informs me that she is drinking fluids regularly to as this helps to reduce frequency of migraines. She is working to restrict fluid intake to 24oz while at work and 24-30oz outside of work. She had been working with neurologist in the past, but has not granted release of these records and thus I cannot see what has been done. We discussed checking A1c to rule out diabetes as cause for her frequent urination/thirst. I will have her meet with urologist/gynecologist. I did offer preventative migraine treatment such as nurtec or effexor. She declined at this time.   - Adult Uro/Gyn  Referral; Future  - Hemoglobin A1c; Future    JOCY [facility]   Kayy is a 28 year old, presenting for the following health issues:  Form Request (Doctors note for work) and LAB REQUEST        6/6/2023     7:58 AM   Additional Questions   Roomed by Donis HILL   Accompanied by Self         6/6/2023     7:58 AM   Patient Reported Additional Medications   Patient reports taking the following new medications None     History of Present Illness        Reason for visit:  I need a doctor's note for work, i also need to schedule an in person appointment and get some tests done    She eats 2-3 servings of fruits and vegetables daily.She consumes 1 sweetened beverage(s) daily.She exercises with enough effort to increase her heart rate 10 to 19 minutes per day.  She exercises with enough effort to increase her heart rate 5 days per week.   She is taking medications regularly.     [email or web address]    Review of Systems   Constitutional, HEENT, cardiovascular, pulmonary, gi and gu systems are negative, except as otherwise noted.      Objective           Vitals:  No vitals were obtained today due to virtual visit.    Physical Exam   GENERAL: Healthy, alert and no distress  EYES: Eyes grossly normal to inspection.  No discharge or erythema, or obvious scleral/conjunctival abnormalities.  RESP: No audible wheeze, cough, or visible cyanosis.  No visible retractions or increased work of breathing.    SKIN: Visible skin clear. No significant rash, abnormal pigmentation or lesions.  NEURO: Cranial nerves grossly intact.  Mentation and speech appropriate for age.  PSYCH: Mentation appears normal, affect normal/bright, judgement and insight intact, normal speech and appearance well-groomed.            Video-Visit Details    Type of service:  Video Visit     Originating Location (pt. Location): Home    Joined the call at 6/6/2023, 5:14:33?pm.  Left the call at 6/6/2023, 5:32:19?pm.  You were on the call for 17 minutes 45 seconds .    Distant Location (provider location):  On-site  Platform used for Video Visit: Danyelle

## 2023-06-06 NOTE — PATIENT INSTRUCTIONS
Rolando Jain PA-C   [address] [place] [address]   Phone: [phone]   Fax: [phone]    Diagnoses: Urinary frequency   Order: Adult Uro/Gyn  Referral       Comment: Please be aware that coverage of these services is subject to the terms and limitations of your health insurance plan.  Call member services at your health plan with any benefit or coverage questions.

## 2023-06-07 DIAGNOSIS — G43.909 MIGRAINE WITHOUT STATUS MIGRAINOSUS, NOT INTRACTABLE, UNSPECIFIED MIGRAINE TYPE: Primary | ICD-10-CM

## 2023-06-13 ENCOUNTER — LAB (OUTPATIENT)
Dept: LAB | Facility: OTHER | Age: 28
End: 2023-06-13
Payer: COMMERCIAL

## 2023-06-13 DIAGNOSIS — R35.0 URINARY FREQUENCY: ICD-10-CM

## 2023-06-13 LAB — HBA1C MFR BLD: 5 % (ref 0–5.6)

## 2023-06-13 PROCEDURE — 36415 COLL VENOUS BLD VENIPUNCTURE: CPT

## 2023-06-13 PROCEDURE — 83036 HEMOGLOBIN GLYCOSYLATED A1C: CPT

## 2023-06-20 ENCOUNTER — TELEPHONE (OUTPATIENT)
Dept: FAMILY MEDICINE | Facility: OTHER | Age: 28
End: 2023-06-20

## 2023-06-20 ENCOUNTER — VIRTUAL VISIT (OUTPATIENT)
Dept: FAMILY MEDICINE | Facility: OTHER | Age: 28
End: 2023-06-20
Payer: COMMERCIAL

## 2023-06-20 DIAGNOSIS — R11.0 NAUSEA: ICD-10-CM

## 2023-06-20 DIAGNOSIS — R19.7 DIARRHEA, UNSPECIFIED TYPE: Primary | ICD-10-CM

## 2023-06-20 PROCEDURE — 99213 OFFICE O/P EST LOW 20 MIN: CPT | Mod: VID | Performed by: PHYSICIAN ASSISTANT

## 2023-06-20 RX ORDER — LOPERAMIDE HYDROCHLORIDE 2 MG/1
2 TABLET ORAL 4 TIMES DAILY PRN
Qty: 30 TABLET | Refills: 0 | Status: SHIPPED | OUTPATIENT
Start: 2023-06-20 | End: 2024-03-11

## 2023-06-20 NOTE — PROGRESS NOTES
Kayy is a 28 year old who is being evaluated via a billable video visit.      How would you like to obtain your AVS? MyChart  If the video visit is dropped, the invitation should be resent by: Text to cell phone: [phone]  Will anyone else be joining your video visit? No      Assessment & Plan     Diarrhea, unspecified type  Nausea  Discussed with the patient that her symptoms are consistent with viral illness. She said that several coworkers have had similar symptoms. I advised that she complete home covid kit to rule out this infection. If this returns positive I will have her quarantine at home until symptom free. I reviewed importance of bland diet for bowel symptoms. She will have loperamide and bismuth subsalicylate to use as needed for management of the diarrhea and nausea. Educational information attached to the AVS. Letter provided for work. She will reach out if not improving as expected.   - loperamide (IMODIUM A-D) 2 MG tablet; Take 1 tablet (2 mg) by mouth 4 times daily as needed for diarrhea  - bismuth subsalicylate 262 MG TABS; 1-2 tablets (262mg-524mg) every 60 minutes as needed  (maximum: 8 tabs/24 hours).       [facility]OC[facility]   Kayy is a 28 year old, presenting for the following health issues:  stomach problems      HPI     Would like a note for work.    Acute Illness  Acute illness concerns: diarrhea, headache, nausea  Onset/Duration: couple of days  Symptoms:  Fever: No  Chills/Sweats: YES  Headache (location?): YES- top of forehead  Sinus Pressure: No  Conjunctivitis:  No  Ear Pain: no  Rhinorrhea: YES  Congestion: No  Sore Throat: YES- scratchy and dry  Cough: YES-productive of clear sputum, with shortness of breath  Wheeze: No  Decreased Appetite: YES  Nausea: YES  Vomiting: No  Diarrhea: YES  Dysuria/Freq.: No  Dysuria or Hematuria: No  Fatigue/Achiness: YES  Sick/Strep Exposure: YES- coworkers have gone home sick  Therapies  tried and outcome: increase in fluids, ibuprofen    Diarrhea  Onset/Duration: 2-3 days  Description:       Consistency of stool: loose, green, mucousy and orange       Blood in stool: No       Number of loose stools past 24 hours: about 4  Progression of Symptoms: same  Accompanying signs and symptoms:       Fever: No       Nausea/Vomiting: YES       Abdominal pain: YES       Weight loss: No       Episodes of constipation: No  History   Ill contacts: YES- coworkers  Recent use of antibiotics: No  Recent travels: No  Recent medication-new or changes(Rx or OTC): YES- Nurtec  Precipitating or alleviating factors: None  Therapies tried and outcome: charcoal caps, prilosec in AM to help with heartburn        Review of Systems   Constitutional, HEENT, cardiovascular, pulmonary, gi and gu systems are negative, except as otherwise noted.      Objective           Vitals:  No vitals were obtained today due to virtual visit.    Physical Exam   GENERAL: Healthy, alert and no distress  EYES: Eyes grossly normal to inspection.  No discharge or erythema, or obvious scleral/conjunctival abnormalities.  RESP: No audible wheeze, cough, or visible cyanosis.  No visible retractions or increased work of breathing.    SKIN: Visible skin clear. No significant rash, abnormal pigmentation or lesions.  NEURO: Cranial nerves grossly intact.  Mentation and speech appropriate for age.  PSYCH: Mentation appears normal, affect normal/bright, judgement and insight intact, normal speech and appearance well-groomed.            Video-Visit Details    Type of service:  Video Visit     Joined the call at 6/20/2023, 5:07:06?pm.  Left the call at 6/20/2023, 5:22:40?pm.  You were on the call for 15 minutes 33 seconds .    Originating Location (pt. Location): Home    Distant Location (provider location):  On-site  Platform used for Video Visit: [employer]

## 2023-06-20 NOTE — TELEPHONE ENCOUNTER
Spoke with patient.  Diarrhea for a few days now. 3-4 episodes per day. Nausea and migraine today. Just left work because she is not feeling well.  No blood seen in stool. Having been stomach pains for that last few days, does get a little better with passing stools.   Would like note for work.  advised would need some sort of visit.    Virtual video made for this afternoon.    MARIA ELENA MedranoN, RN, PHN  [facility] ~ Registered Nurse  Clinic Triage ~ [place] [facility] [place]  June 20, 2023

## 2023-06-20 NOTE — LETTER
June 20, 2023      Kayy PUTNAM Post [address]   [facility] [address]        To Whom It May Concern,       Kayy was seen virtually this afternoon, June 20, 2023, for suspected viral illness. She has been advised to remain out of work until symptoms are minimal or mostly resolved, estimated 1-3 days.       Sincerely,        Rolando Jain PA-C

## 2023-06-21 ENCOUNTER — TELEPHONE (OUTPATIENT)
Dept: FAMILY MEDICINE | Facility: OTHER | Age: 28
End: 2023-06-21
Payer: COMMERCIAL

## 2023-06-21 NOTE — TELEPHONE ENCOUNTER
Please reach out to patient to coordinate her picking up a printed copy of letter from virtual visit on 06/20/23. Please print out copy for her as well.    Thanks,  Rolando Jain PA-C on 6/21/2023 at 12:19 PM

## 2023-07-05 ENCOUNTER — TELEPHONE (OUTPATIENT)
Dept: OBGYN | Facility: CLINIC | Age: 28
End: 2023-07-05
Payer: COMMERCIAL

## 2023-07-05 ENCOUNTER — OFFICE VISIT (OUTPATIENT)
Dept: UROLOGY | Facility: CLINIC | Age: 28
End: 2023-07-05
Attending: OBSTETRICS & GYNECOLOGY
Payer: COMMERCIAL

## 2023-07-05 VITALS
WEIGHT: 202.5 LBS | HEART RATE: 78 BPM | BODY MASS INDEX: 37.26 KG/M2 | HEIGHT: 62 IN | DIASTOLIC BLOOD PRESSURE: 81 MMHG | SYSTOLIC BLOOD PRESSURE: 118 MMHG

## 2023-07-05 DIAGNOSIS — R35.0 URINARY FREQUENCY: ICD-10-CM

## 2023-07-05 LAB
ALBUMIN UR-MCNC: NEGATIVE MG/DL
APPEARANCE UR: CLEAR
BILIRUB UR QL STRIP: NEGATIVE
COLOR UR AUTO: YELLOW
GLUCOSE UR STRIP-MCNC: NEGATIVE MG/DL
HGB UR QL STRIP: NEGATIVE
KETONES UR STRIP-MCNC: NEGATIVE MG/DL
LEUKOCYTE ESTERASE UR QL STRIP: NEGATIVE
NITRATE UR QL: NEGATIVE
PH UR STRIP: 5.5 [PH] (ref 5–8)
SP GR UR STRIP: 1.02 (ref 1–1.03)
UROBILINOGEN UR STRIP-ACNC: 0.2 E.U./DL

## 2023-07-05 PROCEDURE — 99203 OFFICE O/P NEW LOW 30 MIN: CPT | Performed by: OBSTETRICS & GYNECOLOGY

## 2023-07-05 PROCEDURE — 81003 URINALYSIS AUTO W/O SCOPE: CPT | Performed by: OBSTETRICS & GYNECOLOGY

## 2023-07-05 PROCEDURE — G0463 HOSPITAL OUTPT CLINIC VISIT: HCPCS | Performed by: OBSTETRICS & GYNECOLOGY

## 2023-07-05 RX ORDER — SOLIFENACIN SUCCINATE 5 MG/1
5 TABLET, FILM COATED ORAL DAILY
Qty: 30 TABLET | Refills: 3 | Status: SHIPPED | OUTPATIENT
Start: 2023-07-05 | End: 2023-09-08 | Stop reason: SINTOL

## 2023-07-05 NOTE — TELEPHONE ENCOUNTER
Kayy called the triage line, stuck in traffic.  She stated she will be here about 4:30 (her scheduled appointment time).  I gave her clear directions to the parking ramp and building.

## 2023-07-05 NOTE — LETTER
[facility] [place]  [address]  [facility] [address]  Phone: [phone]  Fax: [phone]    July 5, 2023        Kayy PUTNAM Olesya Alice  [address]   [facility] [address]          To whom it may concern:    RE: Kayymima Dacosta Jenkins    Patient was seen and treated today at our clinic. She is starting medication and physical therapy for her bladder.    Please contact me for questions or concerns.      Sincerely,        Tucker Castellano MD

## 2023-07-05 NOTE — PROGRESS NOTES
July 5, 2023    Referring Provider: Rolando Jain PA-C  [address] [place] [address]    Primary Care Provider: Rolando Jain    CC: urinary frequency    HPI:  Kayy Jenkins is a 28 year old female who presents for evaluation of her pelvic floor symptoms.  She has a long h/o urinary frequency. Kayy will empty her bladder as often as every 15-20 minutes during the day and will get up 3-4 times at night. She drinks up 48 ounces of water during the day.      Prolapse:  Do you feel a vaginal bulge? no                                      Pressure? no   Do you have to place your fingers in the vagina or in the rectum to have a bowel movement? no  Impact to quality of life? -     Stress Incontinence:  Do you leak urine with cough, sneeze, exercise? no  How often do you leak with cough, sneeze, exercise?  -  How much do you usually leak? -   Do you wear a pad? - If so; -  Impact to quality of life? -    Urge Incontinence:  Do you often get sudden urges to urinate? yes  How often do have urges? daily  If so, do you leak with these urges? yes  How much do you usually leak? drops  Impact to quality of life? moderate        Past Medical History:   Diagnosis Date     Anxiety      Borderline personality disorder (H)      Major depression      PTSD (post-traumatic stress disorder)        Past Surgical History:   Procedure Laterality Date     wisdom teeth         Social History     Socioeconomic History     Marital status: Single     Spouse name: Not on file     Number of children: Not on file     Years of education: Not on file     Highest education level: Not on file   Occupational History     Not on file   Tobacco Use     Smoking status: Former     Types: Cigarettes     Passive exposure: Never     Smokeless tobacco: Never   Vaping Use     Vaping Use: Never used   Substance and Sexual Activity     Alcohol use: Yes     Drug use: Not Currently     Types: Marijuana     Sexual activity: Not on file  "  Other Topics Concern     Not on file   Social History Narrative     Not on file     Social Determinants of Health     Financial Resource Strain: Not on file   Food Insecurity: Not on file   Transportation Needs: Not on file   Physical Activity: Not on file   Stress: Not on file   Social Connections: Not on file   Intimate Partner Violence: Not on file   Housing Stability: Not on file       Family History   Problem Relation Age of Onset     Anxiety Disorder Mother      Depression Mother      Post-Traumatic Stress Disorder (PTSD) Father        ROS    Allergies   Allergen Reactions     Amoxicillin Rash       Current Outpatient Medications   Medication     rimegepant (NURTEC) 75 MG ODT tablet     loperamide (IMODIUM A-D) 2 MG tablet     No current facility-administered medications for this visit.       /81   Pulse 78   Ht 1.585 m (5' 2.4\")   Wt 91.9 kg (202 lb 8 oz)   LMP 06/11/2023 (Approximate)   BMI 36.56 kg/m   Patient's last menstrual period was 06/11/2023 (approximate). Body mass index is 36.56 kg/m .  Ms. Dacosta is alert, comfortable in no acute distress, non-labored breathing.     A/P: Kayy Jenkins is a 28 year old F with urinary frequency    Start vesicare. Refer to PFPT    I spent a total of 30 minutes with  Ms. Dacosta  on the date of the encounter in chart review, face to face patient visit, review of tests, documentation and/or discussion with other providers about the issues documented above.    Tucker Castellano MD  Professor, OB/GYN  Urogynecologist  CC  Patient Care Team:  Devonte Davis PA-C as PCP - General (Internal Medicine)  Devonte Davis PA-C as Assigned PCP  Tucker Castellano MD as MD (OB/Gyn)  DEVONTE DAVIS              "

## 2023-07-05 NOTE — LETTER
7/5/2023       RE: Kayy Jenkins  [address]  [facility] [address]     Dear Colleague,    Thank you for referring your patient, Kayy Jenkins, to the [facility] [place] at [facility]. Please see a copy of my visit note below.    July 5, 2023    Referring Provider: Rolando Jain PA-C  [address] [place] [id number]    Primary Care Provider: Rolando Jain    CC: urinary frequency    HPI:  Kayy Jenkins is a 28 year old female who presents for evaluation of her pelvic floor symptoms.  She has a long h/o urinary frequency. Kayy will empty her bladder as often as every 15-20 minutes during the day and will get up 3-4 times at night. She drinks up 48 ounces of water during the day.      Prolapse:  Do you feel a vaginal bulge? no                                      Pressure? no   Do you have to place your fingers in the vagina or in the rectum to have a bowel movement? no  Impact to quality of life? -     Stress Incontinence:  Do you leak urine with cough, sneeze, exercise? no  How often do you leak with cough, sneeze, exercise?  -  How much do you usually leak? -   Do you wear a pad? - If so; -  Impact to quality of life? -    Urge Incontinence:  Do you often get sudden urges to urinate? yes  How often do have urges? daily  If so, do you leak with these urges? yes  How much do you usually leak? drops  Impact to quality of life? moderate        Past Medical History:   Diagnosis Date    Anxiety     Borderline personality disorder (H)     Major depression     PTSD (post-traumatic stress disorder)        Past Surgical History:   Procedure Laterality Date    wisdom teeth         Social History     Socioeconomic History    Marital status: Single     Spouse name: Not on file    Number of children: Not on file    Years of education: Not on file    Highest education level: Not on file   Occupational  "History    Not on file   Tobacco Use    Smoking status: Former     Types: Cigarettes     Passive exposure: Never    Smokeless tobacco: Never   Vaping Use    Vaping Use: Never used   Substance and Sexual Activity    Alcohol use: Yes    Drug use: Not Currently     Types: Marijuana    Sexual activity: Not on file   Other Topics Concern    Not on file   Social History Narrative    Not on file     Social Determinants of Health     Financial Resource Strain: Not on file   Food Insecurity: Not on file   Transportation Needs: Not on file   Physical Activity: Not on file   Stress: Not on file   Social Connections: Not on file   Intimate Partner Violence: Not on file   Housing Stability: Not on file       Family History   Problem Relation Age of Onset    Anxiety Disorder Mother     Depression Mother     Post-Traumatic Stress Disorder (PTSD) Father        ROS    Allergies   Allergen Reactions    Amoxicillin Rash       Current Outpatient Medications   Medication    rimegepant (NURTEC) 75 MG ODT tablet    loperamide (IMODIUM A-D) 2 MG tablet     No current facility-administered medications for this visit.       /81   Pulse 78   Ht 1.585 m (5' 2.4\")   Wt 91.9 kg (202 lb 8 oz)   LMP 06/11/2023 (Approximate)   BMI 36.56 kg/m   Patient's last menstrual period was 06/11/2023 (approximate). Body mass index is 36.56 kg/m .  Ms. Dacosta is alert, comfortable in no acute distress, non-labored breathing.     A/P: Kayy Jenkins is a 28 year old F with urinary frequency    Start vesicare. Refer to PFPT    I spent a total of 30 minutes with  Ms. Dacosta  on the date of the encounter in chart review, face to face patient visit, review of tests, documentation and/or discussion with other providers about the issues documented above.    Tucker Castellano MD  Professor, OB/GYN  Urogynecologist  CC  Patient Care Team:  Rolando Jain PA-C as PCP - General (Internal Medicine)  Rolando Jain PA-C as Assigned PCP  Tucker Castellano " MD [place] as MD (OB/Gyn)

## 2023-07-07 ENCOUNTER — MYC MEDICAL ADVICE (OUTPATIENT)
Dept: FAMILY MEDICINE | Facility: OTHER | Age: 28
End: 2023-07-07
Payer: COMMERCIAL

## 2023-07-10 NOTE — TELEPHONE ENCOUNTER
"Patient saw urology 7/5/23 \"Start vesicare. Refer to PFPT\"    Erinn ARREDONDON, RN  [facility]    "

## 2023-07-24 NOTE — TELEPHONE ENCOUNTER
encounter opened in error.      Petra Hoff RN  [facility] ~ Registered Nurse  Clinic Triage ~ [place] [facility] [place]  July 24, 2023

## 2023-07-24 NOTE — TELEPHONE ENCOUNTER
Had lunch and threw up.  Left work.  Stomach upset and she feels shaky.      No blood in vomit.  Denies headaches or blurred vision.  Denies fevers.    Did have a bowl of cereal this am and did well.  Had a slice of pizza for lunch and threw it up.  Just had one emesis.  Denies diarrhea.    Recommended staying hydrated and drinking water or gatorade and eat smaller meals more frequently.    If she starts having fevers, more emesis, diarrhea or has headaches, dizziness, blurred vision or weakness, to call back nurse triage line.    Patient agreed with plan above.    Petra Hoff RN  [facility] ~ Registered Nurse  Clinic Triage ~ [place] [facility] [place]  July 24, 2023

## 2023-07-31 ENCOUNTER — MYC MEDICAL ADVICE (OUTPATIENT)
Dept: FAMILY MEDICINE | Facility: OTHER | Age: 28
End: 2023-07-31
Payer: COMMERCIAL

## 2023-07-31 DIAGNOSIS — G43.909 MIGRAINE WITHOUT STATUS MIGRAINOSUS, NOT INTRACTABLE, UNSPECIFIED MIGRAINE TYPE: ICD-10-CM

## 2023-08-02 ENCOUNTER — LAB (OUTPATIENT)
Dept: LAB | Facility: OTHER | Age: 28
End: 2023-08-02
Payer: COMMERCIAL

## 2023-08-02 ENCOUNTER — THERAPY VISIT (OUTPATIENT)
Dept: PHYSICAL THERAPY | Facility: CLINIC | Age: 28
End: 2023-08-02
Attending: OBSTETRICS & GYNECOLOGY
Payer: COMMERCIAL

## 2023-08-02 DIAGNOSIS — R35.0 URINARY FREQUENCY: ICD-10-CM

## 2023-08-02 DIAGNOSIS — M62.89 PELVIC FLOOR DYSFUNCTION: Primary | ICD-10-CM

## 2023-08-02 DIAGNOSIS — R30.0 DYSURIA: Primary | ICD-10-CM

## 2023-08-02 DIAGNOSIS — N94.10 DYSPAREUNIA IN FEMALE: ICD-10-CM

## 2023-08-02 DIAGNOSIS — R30.0 DYSURIA: ICD-10-CM

## 2023-08-02 LAB
ALBUMIN UR-MCNC: NEGATIVE MG/DL
APPEARANCE UR: CLEAR
BILIRUB UR QL STRIP: NEGATIVE
COLOR UR AUTO: YELLOW
GLUCOSE UR STRIP-MCNC: NEGATIVE MG/DL
HGB UR QL STRIP: NEGATIVE
KETONES UR STRIP-MCNC: NEGATIVE MG/DL
LEUKOCYTE ESTERASE UR QL STRIP: NEGATIVE
NITRATE UR QL: NEGATIVE
PH UR STRIP: 6.5 [PH] (ref 5–7)
SP GR UR STRIP: 1.02 (ref 1–1.03)
UROBILINOGEN UR STRIP-ACNC: 0.2 E.U./DL

## 2023-08-02 PROCEDURE — 97112 NEUROMUSCULAR REEDUCATION: CPT | Mod: GP | Performed by: PHYSICAL THERAPIST

## 2023-08-02 PROCEDURE — 97535 SELF CARE MNGMENT TRAINING: CPT | Mod: GP | Performed by: PHYSICAL THERAPIST

## 2023-08-02 PROCEDURE — 81003 URINALYSIS AUTO W/O SCOPE: CPT

## 2023-08-02 PROCEDURE — 97162 PT EVAL MOD COMPLEX 30 MIN: CPT | Mod: GP | Performed by: PHYSICAL THERAPIST

## 2023-08-02 NOTE — PROGRESS NOTES
PHYSICAL THERAPY EVALUATION  Type of Visit: Evaluation    See electronic medical record for Abuse and Falls Screening details.    Subjective       Presenting condition or subjective complaint: Overactive bladder  Date of onset: 07/05/23 (date of referral)    Relevant medical history: Depression; Dizziness; Hearing problems; Incontinence; Mental Illness; Migraines or headaches; Overweight; Significant weakness   Dates & types of surgery: [place] teeth extraction a few months ago, don't remember the exact date    Pt notes she has had urgency and frequency issues as well as leaking since she was a kid. Does not remember how long. Feels that this has gotten worse as she has gotten older. Started taking medication for bladder 7/5/23, and this helped to decrease frequency from every 10 min, to every 30 min-hour. Was having side effects such as constipation, nausea, vomiting, dry eyes, dry mouth, and dizziness so discontinued medication. Also has pain w/ intercourse and intermittently pain with bowel movements (if constipated).     Prior diagnostic imaging/testing results: Other No   Prior therapy history for the same diagnosis, illness or injury: No      Prior Level of Function  Transfers: Independent  Ambulation: Independent  ADL: Independent      Living Environment  Social support: With a significant other or spouse   Type of home: Apartment/condo   Stairs to enter the home: Yes 2 Is there a railing: Yes   Ramp: No   Stairs inside the home: No       Help at home:    Equipment owned: Crutches     Employment: Yes   Hobbies/Interests: Listening to music, reading, watching tv, walking    Patient goals for therapy: I would like to not have to go to the bathroom every 5-10 minutes    Pain assessment: Pain present  See objective evaluation for additional pain details     Objective      PELVIC EVALUATION  ADDITIONAL HISTORY:  Sex assigned at birth: Female  Gender identity:      Pronouns:        Bladder  History:  Feels bladder filling: Yes  Triggers for feeling of inability to wait to go to the bathroom: No    How long can you wait to urinate: Anywhere from 15-30 minutes sometimes an hour  Gets up at night to urinate: Yes 3-4  Can stop the flow of urine when urinating: Sometimes  Volume of urine usually released: Large   Other issues: Straining to pass urine; Slow or hesitant urine stream; Trouble emptying bladder completely; Dribbling after urinating  Number of bladder infections in last 12 months:    Fluid intake per day: 24-48 give or take      Medications taken for bladder: Yes Solifenacin   Activities causing urine leak: Cough; Sneeze; Other activities Sitting  Amount of urine typically leaked: Just a small amount, enough to see on the seat. Also enough to have a noticable wet spot on my pants  Pads used to help with leaking: No        Bowel History:  Frequency of bowel movement: With medication: 1-2 times a week if I'm not constipated from it. Without it: 1 or 2 times a day  Consistency of stool: Other Hard, soft, diarrhea  Ignores the urge to defecate: Sometimes  Other bowel issues: Pain when pooping; Loss of gas; Straining to have bowel movement  Length of time spent trying to have a bowel movement: 15-30 minutes    Sexual Function History:  Sexual orientation: Straight    Sexually active: Yes  Lubrication used: No No  Pelvic pain: Initial penetration (rectal or vaginal); Deep penetration (rectal or vaginal)    Pain or difficulty with orgasms/erection/ejaculation: No    State of menopause:    Hormone medications: No      Are you currently pregnant: No, Number of previous pregnancies: 0, Number of deliveries: 0, Have you been diagnosed with pelvic prolapse or abdominal separation: No, Do you get regular exercise: No, Have you tried pelvic floor strengthening exercises for 4 weeks: No, Do you have any history of trauma that is relevant to your care that you d like to share: Yes, I d like to discuss it with my  provider in person.    Discussed reason for referral regarding pelvic health needs and external/internal pelvic floor muscle examination with patient/guardian.  Opportunity provided to ask questions and verbal consent for assessment and intervention was given.    PAIN: Pain Level at Rest: 1/10  Pain Level with Use: 10/10  Pain Location: pelvis/vaginal area  Pain Quality: Sharp and Stabbing  Pain Frequency: intermittent  Pain is Worst: dependent on activity vs time of day  Pain is Exacerbated By: [place] is worst (10/10 pain), but also has pain w/ passing gas, bowel movements, or urination  Pain is Relieved By: rest  Pain Progression: Worsened  POSTURE: Standing Posture: Rounded shoulders, Forward head, Lordosis increased, Thoracic kyphosis increased  LUMBAR SCREEN: Not performed yet  HIP SCREEN: Not performed yet    PELVIC/SI SCREEN:  Not performed yet      PELVIC EXAM  External Visual Inspection:  At rest: Elevated perineal body  With voluntary pelvic floor contraction: Perineal elevation  Relaxation of PFM: Non-relaxation  With intra-abdominal pressure: Cough: Perineal descent  Valsalva: Perineal descent  Bearing down as defecation: Perineal descent    Integumentary:   Introitus: Unremarkable    External Digital Palpation per Perineum:   Not perofrmed    Scar:     Internal Digital Palpation:  Not performed as pt will be getting testing for UTI first due to new onset of pain with urination     Pelvic Organ Prolapse:   None noted    BIOFEEDBACK:  Position: Supine  Surface Electrodes: Perineal       Perianals:   BIOFEEDBACK:   Baseline      Endurance Holds      Quick Flicks        Assessment & Plan   CLINICAL IMPRESSIONS  Medical Diagnosis: Urinary frequency    Treatment Diagnosis: Pelvic floor dysfunction w/ urinary frequency, urgency, dyspareunia, and constipation   Impression/Assessment: Patient is a 28 year old female with pelvic floor complaints.  The following significant findings have been identified:  Pain, Decreased ROM/flexibility, Decreased strength, Impaired muscle performance, and Impaired posture. These impairments interfere with their ability to perform self care tasks, work tasks, recreational activities, and household chores as compared to previous level of function.     Clinical Decision Making (Complexity):  Clinical Presentation: Evolving/Changing  Clinical Presentation Rationale: based on medical and personal factors listed in PT evaluation  Clinical Decision Making (Complexity): Moderate complexity    PLAN OF CARE  Treatment Interventions:  Modalities: Biofeedback  Interventions: Manual Therapy, Neuromuscular Re-education, Therapeutic Activity, Therapeutic Exercise, Self-Care/Home Management    Long Term Goals     PT Goal 1  Goal Identifier: Freqency  Goal Description: Pt will be able to wait and average of 2 hours between day time voids to establish regular voiding habbits and decrease time away at work and to be able to complete home tasks  Target Date: 10/25/23  PT Goal 2  Goal Identifier: Nocturia  Goal Description: Pt will void 1x/night or less to establish restorative sleep pattern  Target Date: 10/25/23      Frequency of Treatment: 2-3x/month  Duration of Treatment: x3 months (8 visits total)    Recommended Referrals to Other Professionals:  none  Education Assessment:   Learner/Method: Patient;Listening;Reading;Demonstration;Pictures/Video;No Barriers to Learning    Risks and benefits of evaluation/treatment have been explained.   Patient/Family/caregiver agrees with Plan of Care.     Evaluation Time:     PT Eval, Moderate Complexity Minutes (61982): 25       Signing Clinician: Amanda Hilligoss, PT

## 2023-08-04 ENCOUNTER — TELEPHONE (OUTPATIENT)
Dept: FAMILY MEDICINE | Facility: OTHER | Age: 28
End: 2023-08-04
Payer: COMMERCIAL

## 2023-08-04 NOTE — TELEPHONE ENCOUNTER
Please call patient to coordinate getting her a paper copy of her note/letter from 08/03.    Thanks,  Rolando Jain PA-C on 8/4/2023 at 4:59 PM

## 2023-08-07 NOTE — TELEPHONE ENCOUNTER
#1    Attempted to call patient, no answer/unable to LM. Letter printed and placed in TC HOLD bin.

## 2023-08-08 ENCOUNTER — OFFICE VISIT (OUTPATIENT)
Dept: OBGYN | Facility: OTHER | Age: 28
End: 2023-08-08
Payer: COMMERCIAL

## 2023-08-08 VITALS
WEIGHT: 199.08 LBS | DIASTOLIC BLOOD PRESSURE: 94 MMHG | SYSTOLIC BLOOD PRESSURE: 132 MMHG | BODY MASS INDEX: 35.95 KG/M2 | HEART RATE: 86 BPM

## 2023-08-08 DIAGNOSIS — Z91.410 HISTORY OF RAPE IN ADULTHOOD: ICD-10-CM

## 2023-08-08 DIAGNOSIS — Z11.3 SCREEN FOR STD (SEXUALLY TRANSMITTED DISEASE): ICD-10-CM

## 2023-08-08 DIAGNOSIS — R10.2 PELVIC PAIN IN FEMALE: Primary | ICD-10-CM

## 2023-08-08 LAB
CLUE CELLS: ABNORMAL
TRICHOMONAS, WET PREP: ABNORMAL
WBC'S/HIGH POWER FIELD, WET PREP: ABNORMAL
YEAST, WET PREP: ABNORMAL

## 2023-08-08 PROCEDURE — 87491 CHLMYD TRACH DNA AMP PROBE: CPT | Performed by: OBSTETRICS & GYNECOLOGY

## 2023-08-08 PROCEDURE — 87591 N.GONORRHOEAE DNA AMP PROB: CPT | Performed by: OBSTETRICS & GYNECOLOGY

## 2023-08-08 PROCEDURE — 87389 HIV-1 AG W/HIV-1&-2 AB AG IA: CPT | Performed by: OBSTETRICS & GYNECOLOGY

## 2023-08-08 PROCEDURE — 86803 HEPATITIS C AB TEST: CPT | Performed by: OBSTETRICS & GYNECOLOGY

## 2023-08-08 PROCEDURE — 86780 TREPONEMA PALLIDUM: CPT | Performed by: OBSTETRICS & GYNECOLOGY

## 2023-08-08 PROCEDURE — 36415 COLL VENOUS BLD VENIPUNCTURE: CPT | Performed by: OBSTETRICS & GYNECOLOGY

## 2023-08-08 PROCEDURE — 99213 OFFICE O/P EST LOW 20 MIN: CPT | Performed by: OBSTETRICS & GYNECOLOGY

## 2023-08-08 PROCEDURE — 87210 SMEAR WET MOUNT SALINE/INK: CPT | Performed by: OBSTETRICS & GYNECOLOGY

## 2023-08-08 NOTE — PATIENT INSTRUCTIONS
If you have any questions regarding your visit, Please contact your care team.     [facility]: [phone]    To Schedule an Appointment 24/7  Call: [phone]LEPHONE [facility]     Elvin Frey MD    Medical Assistant    Ambar [facility]-Surgery Scheduler  Susan-Surgery Scheduler     Monday-[place]  1:00 pm-4:30 pm    Tuesday-[place]  7:30 am-4:30 pm    Wednesday-[place]  7:30 am-4:30 pm        Typical Surgery Days: Monday or Thursday       [address] [address]  [phone] appointment line  [phone] Fax    Imaging Scheduling all locations  [phone]    **Surgeries** Our Surgery Schedulers will contact you to schedule. If you do not receive a call within 3 business days, please call [phone].    If you need a medication refill, please contact your pharmacy. Please allow 3 business days for your refill to be completed.    As always, Thank you for trusting us with your healthcare needs!                   see additional instructions from your care team below
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
## 2023-08-09 LAB
C TRACH DNA SPEC QL NAA+PROBE: NEGATIVE
HCV AB SERPL QL IA: NONREACTIVE
HIV 1+2 AB+HIV1 P24 AG SERPL QL IA: NONREACTIVE
N GONORRHOEA DNA SPEC QL NAA+PROBE: NEGATIVE
T PALLIDUM AB SER QL: NONREACTIVE

## 2023-08-17 ENCOUNTER — THERAPY VISIT (OUTPATIENT)
Dept: PHYSICAL THERAPY | Facility: CLINIC | Age: 28
End: 2023-08-17
Payer: COMMERCIAL

## 2023-08-17 DIAGNOSIS — R35.0 URINARY FREQUENCY: Primary | ICD-10-CM

## 2023-08-17 DIAGNOSIS — N94.10 DYSPAREUNIA IN FEMALE: ICD-10-CM

## 2023-08-17 DIAGNOSIS — M62.89 PELVIC FLOOR DYSFUNCTION: ICD-10-CM

## 2023-08-17 PROCEDURE — 97112 NEUROMUSCULAR REEDUCATION: CPT | Mod: GP | Performed by: PHYSICAL THERAPIST

## 2023-08-17 PROCEDURE — 97110 THERAPEUTIC EXERCISES: CPT | Mod: GP | Performed by: PHYSICAL THERAPIST

## 2023-08-28 ENCOUNTER — THERAPY VISIT (OUTPATIENT)
Dept: PHYSICAL THERAPY | Facility: CLINIC | Age: 28
End: 2023-08-28
Payer: COMMERCIAL

## 2023-08-28 DIAGNOSIS — R35.0 URINARY FREQUENCY: Primary | ICD-10-CM

## 2023-08-28 DIAGNOSIS — R10.2 PELVIC PAIN IN FEMALE: ICD-10-CM

## 2023-08-28 DIAGNOSIS — N94.10 DYSPAREUNIA IN FEMALE: ICD-10-CM

## 2023-08-28 DIAGNOSIS — M62.89 PELVIC FLOOR DYSFUNCTION: ICD-10-CM

## 2023-08-28 PROCEDURE — 97110 THERAPEUTIC EXERCISES: CPT | Mod: GP | Performed by: PHYSICAL THERAPIST

## 2023-08-28 PROCEDURE — 97535 SELF CARE MNGMENT TRAINING: CPT | Mod: GP | Performed by: PHYSICAL THERAPIST

## 2023-08-28 PROCEDURE — 97112 NEUROMUSCULAR REEDUCATION: CPT | Mod: GP | Performed by: PHYSICAL THERAPIST

## 2023-08-29 ENCOUNTER — MYC MEDICAL ADVICE (OUTPATIENT)
Dept: OBGYN | Facility: OTHER | Age: 28
End: 2023-08-29

## 2023-08-29 ENCOUNTER — ANCILLARY PROCEDURE (OUTPATIENT)
Dept: ULTRASOUND IMAGING | Facility: OTHER | Age: 28
End: 2023-08-29
Attending: OBSTETRICS & GYNECOLOGY
Payer: COMMERCIAL

## 2023-08-29 DIAGNOSIS — R10.2 PELVIC PAIN IN FEMALE: ICD-10-CM

## 2023-08-29 PROCEDURE — 76830 TRANSVAGINAL US NON-OB: CPT | Mod: TC | Performed by: RADIOLOGY

## 2023-08-29 PROCEDURE — 76856 US EXAM PELVIC COMPLETE: CPT | Mod: TC | Performed by: RADIOLOGY

## 2023-08-31 ENCOUNTER — VIRTUAL VISIT (OUTPATIENT)
Dept: BEHAVIORAL HEALTH | Facility: CLINIC | Age: 28
End: 2023-08-31
Attending: OBSTETRICS & GYNECOLOGY
Payer: COMMERCIAL

## 2023-08-31 DIAGNOSIS — F43.9 TRAUMA AND STRESSOR-RELATED DISORDER: Primary | ICD-10-CM

## 2023-08-31 PROCEDURE — 90834 PSYTX W PT 45 MINUTES: CPT | Mod: TEL

## 2023-08-31 ASSESSMENT — ANXIETY QUESTIONNAIRES
3. WORRYING TOO MUCH ABOUT DIFFERENT THINGS: SEVERAL DAYS
1. FEELING NERVOUS, ANXIOUS, OR ON EDGE: SEVERAL DAYS
2. NOT BEING ABLE TO STOP OR CONTROL WORRYING: SEVERAL DAYS
IF YOU CHECKED OFF ANY PROBLEMS ON THIS QUESTIONNAIRE, HOW DIFFICULT HAVE THESE PROBLEMS MADE IT FOR YOU TO DO YOUR WORK, TAKE CARE OF THINGS AT HOME, OR GET ALONG WITH OTHER PEOPLE: NOT DIFFICULT AT ALL
GAD7 TOTAL SCORE: 7
5. BEING SO RESTLESS THAT IT IS HARD TO SIT STILL: NOT AT ALL
GAD7 TOTAL SCORE: 7
4. TROUBLE RELAXING: NEARLY EVERY DAY
7. FEELING AFRAID AS IF SOMETHING AWFUL MIGHT HAPPEN: NOT AT ALL
6. BECOMING EASILY ANNOYED OR IRRITABLE: SEVERAL DAYS

## 2023-08-31 ASSESSMENT — PATIENT HEALTH QUESTIONNAIRE - PHQ9
SUM OF ALL RESPONSES TO PHQ QUESTIONS 1-9: 8
SUM OF ALL RESPONSES TO PHQ QUESTIONS 1-9: 8
10. IF YOU CHECKED OFF ANY PROBLEMS, HOW DIFFICULT HAVE THESE PROBLEMS MADE IT FOR YOU TO DO YOUR WORK, TAKE CARE OF THINGS AT HOME, OR GET ALONG WITH OTHER PEOPLE: SOMEWHAT DIFFICULT

## 2023-09-07 ENCOUNTER — THERAPY VISIT (OUTPATIENT)
Dept: PHYSICAL THERAPY | Facility: CLINIC | Age: 28
End: 2023-09-07
Payer: COMMERCIAL

## 2023-09-07 DIAGNOSIS — N94.10 DYSPAREUNIA IN FEMALE: ICD-10-CM

## 2023-09-07 DIAGNOSIS — R35.0 URINARY FREQUENCY: Primary | ICD-10-CM

## 2023-09-07 DIAGNOSIS — M62.89 PELVIC FLOOR DYSFUNCTION: ICD-10-CM

## 2023-09-07 PROCEDURE — 97112 NEUROMUSCULAR REEDUCATION: CPT | Mod: GP | Performed by: PHYSICAL THERAPIST

## 2023-09-07 PROCEDURE — 97110 THERAPEUTIC EXERCISES: CPT | Mod: GP | Performed by: PHYSICAL THERAPIST

## 2023-09-07 PROCEDURE — 97140 MANUAL THERAPY 1/> REGIONS: CPT | Mod: GP | Performed by: PHYSICAL THERAPIST
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
## 2023-09-08 ENCOUNTER — VIRTUAL VISIT (OUTPATIENT)
Dept: SLEEP MEDICINE | Facility: CLINIC | Age: 28
End: 2023-09-08
Attending: PHYSICIAN ASSISTANT
Payer: COMMERCIAL

## 2023-09-08 VITALS — BODY MASS INDEX: 35.44 KG/M2 | HEIGHT: 63 IN | WEIGHT: 200 LBS

## 2023-09-08 DIAGNOSIS — R06.83 SNORING: Primary | ICD-10-CM

## 2023-09-08 DIAGNOSIS — R40.0 DAYTIME SLEEPINESS: ICD-10-CM

## 2023-09-08 DIAGNOSIS — F51.04 CHRONIC INSOMNIA: ICD-10-CM

## 2023-09-08 DIAGNOSIS — G47.50 PARASOMNIA, UNSPECIFIED TYPE: ICD-10-CM

## 2023-09-08 DIAGNOSIS — F32.2 CURRENT SEVERE EPISODE OF MAJOR DEPRESSIVE DISORDER WITHOUT PSYCHOTIC FEATURES, UNSPECIFIED WHETHER RECURRENT (H): ICD-10-CM

## 2023-09-08 DIAGNOSIS — F45.8 BRUXISM: ICD-10-CM

## 2023-09-08 PROCEDURE — 99205 OFFICE O/P NEW HI 60 MIN: CPT | Mod: VID | Performed by: PHYSICIAN ASSISTANT

## 2023-09-08 ASSESSMENT — SLEEP AND FATIGUE QUESTIONNAIRES
HOW LIKELY ARE YOU TO NOD OFF OR FALL ASLEEP WHILE WATCHING TV: MODERATE CHANCE OF DOZING
HOW LIKELY ARE YOU TO NOD OFF OR FALL ASLEEP WHILE LYING DOWN TO REST IN THE AFTERNOON WHEN CIRCUMSTANCES PERMIT: HIGH CHANCE OF DOZING
HOW LIKELY ARE YOU TO NOD OFF OR FALL ASLEEP IN A CAR, WHILE STOPPED FOR A FEW MINUTES IN TRAFFIC: WOULD NEVER DOZE
HOW LIKELY ARE YOU TO NOD OFF OR FALL ASLEEP WHEN YOU ARE A PASSENGER IN A CAR FOR AN HOUR WITHOUT A BREAK: MODERATE CHANCE OF DOZING
HOW LIKELY ARE YOU TO NOD OFF OR FALL ASLEEP WHILE SITTING AND READING: HIGH CHANCE OF DOZING
HOW LIKELY ARE YOU TO NOD OFF OR FALL ASLEEP WHILE SITTING QUIETLY AFTER LUNCH WITHOUT ALCOHOL: HIGH CHANCE OF DOZING
HOW LIKELY ARE YOU TO NOD OFF OR FALL ASLEEP WHILE SITTING AND TALKING TO SOMEONE: SLIGHT CHANCE OF DOZING
HOW LIKELY ARE YOU TO NOD OFF OR FALL ASLEEP WHILE SITTING INACTIVE IN A PUBLIC PLACE: MODERATE CHANCE OF DOZING

## 2023-09-08 ASSESSMENT — PAIN SCALES - GENERAL: PAINLEVEL: WORST PAIN (10)

## 2023-09-08 NOTE — NURSING NOTE
Is the patient currently in the state of [place]? YES    Visit mode:VIDEO    If the visit is dropped, the patient can be reconnected by: VIDEO VISIT: Text to cell phone:   Telephone Information:   Mobile [phone]       Will anyone else be joining the visit? NO  (If patient encounters technical issues they should call [phone]    How would you like to obtain your AVS? MyChart    Are changes needed to the allergy or medication list? No    Reason for visit: Consult  Has patient had flu shot for current/most recent flu season? If so, when? No    Janelle HOUSTON

## 2023-09-08 NOTE — PATIENT INSTRUCTIONS
Dream Rehearsal:  Write down an average nightmare in as much detail as you can remember (this step is actually optional if it is anxiety provoking to do this). Then, rewrite the dream changing the negative aspects into positive aspects. Alternatively, you can come up with an entirely new dream (it can be literally anything as long as the content is pleasant).  Spend 1-2 minutes, several times per day, imagining the pleasant dream.  Walk yourself through the entire dream in your mind as if you were dreaming it. Do this before bed as well as other times of the day. Create new dreams no more than twice per week.  Sometimes practicing more pleasant content like that can change the emotion and content of your dreams.    Tips to safeguard your sleeping environment to prevent injury from dream enactment:    Move hard or sharp objects or clutter away from the bedside (tables, etc).  Put pillows between you and a bed partner.  Use padded side rails on the bed to prevent falling out of bed, or move the mattress to the floor so there is not far to fall.  Pad the floor near the bed  Sleep in a sleeping bag to reduce how far or hard you can kick/punch.  Remove dangerous objects from the bedroom, such as sharp items and weapons  Protect bedroom windows to prevent you from being able to go out the window in your sleep.  Possibly sleep in a separate bed or room from your bed partner until symptoms are controlled               MY TREATMENT INFORMATION FOR SLEEP APNEA-  Kayy Dacosat Jenkins    DOCTOR : Bennett Goltz, PA-C    Am I having a sleep study at a sleep center?  --->Due to normal delays, you will be contacted within 2-4 weeks to schedule    Am I having a home sleep study?  --->Watch the video for the device you are using:      -Disposable device sent out require phone/computer application-   https://www.youtube.com/watch?v=BCce_vbiwxE    Frequently asked questions:  1. What is Obstructive Sleep Apnea (GRUPO)? GRUPO is the  "most common type of sleep apnea. Apnea means, \"without breath.\"  Apnea is most often caused by narrowing or collapse of the upper airway as muscles relax during sleep.   Almost everyone has occasional apneas. Most people with sleep apnea have had brief interruptions at night frequently for many years.  The severity of sleep apnea is related to how frequent and severe the events are.   2. What are the consequences of GRUPO? Symptoms include: feeling sleepy during the day, snoring loudly, gasping or stopping of breathing, trouble sleeping, and occasionally morning headaches or heartburn at night.  Sleepiness can be serious and even increase the risk of falling asleep while driving. Other health consequences may include development of high blood pressure and other cardiovascular disease in persons who are susceptible. Untreated GRUPO  can contribute to heart disease, stroke and diabetes.   3. What are the treatment options? In most situations, sleep apnea is a lifelong disease that must be managed with daily therapy. Medications are not effective for sleep apnea and surgery is generally not considered until other therapies have been tried. Your treatment is your choice . Continuous Positive Airway (CPAP) works right away and is the therapy that is effective in nearly everyone. An oral device to hold your jaw forward is usually the next most reliable option. Other options include postioning devices (to keep you off your back), weight loss, and surgery including a tongue pacing device. There is more detail about some of these options below.  4. Are my sleep studies covered by insurance? Although we will request verification of coverage, we advise you also check in advance of the study to ensure there is coverage.    Important tips for those choosing CPAP and similar devices  For new devices, sign up for device YVON to monitor your device for your followup visits  We encourage you to utilize the [employer]Air by [employer] yvon or website " ([employer] (resmed.com) ) to monitor your therapy progress and share the data with your healthcare team when you discuss your sleep apnea.                                                    Know your equipment:  CPAP is continuous positive airway pressure that prevents obstructive sleep apnea by keeping the throat from collapsing while you are sleeping. In most cases, the device is  smart  and can slowly self-adjusts if your throat collapses and keeps a record every day of how well you are treated-this information is available to you and your care team.  BPAP is bilevel positive airway pressure that keeps your throat open and also assists each breath with a pressure boost to maintain adequate breathing.  Special kinds of BPAP are used in patients who have inadequate breathing from lung or heart disease. In most cases, the device is  smart  and can slowly self-adjusts to assist breathing. Like CPAP, the device keeps a record of how well you are treated.  Your mask is your connection to the device. You get to choose what feels most comfortable and the staff will help to make sure if fits. Here: are some examples of the different masks that are available:       Key points to remember on your journey with sleep apnea:  Sleep study.  PAP devices often need to be adjusted during a sleep study to show that they are effective and adjusted right.  Good tips to remember: Try wearing just the mask during a quiet time during the day so your body adapts to wearing it. A humidifier is recommended for comfort in most cases to prevent drying of your nose and throat. Allergy medication from your provider may help you if you are having nasal congestion.  Getting settled-in. It takes more than one night for most of us to get used to wearing a mask. Try wearing just the mask during a quiet time during the day so your body adapts to wearing it. A humidifier is recommended for comfort in most cases. Our team will work with you carefully  on the first day and will be in contact within 4 days and again at 2 and 4 weeks for advice and remote device adjustments. Your therapy is evaluated by the device each day.   Use it every night. The more you are able to sleep naturally for 7-8 hours, the more likely you will have good sleep and to prevent health risks or symptoms from sleep apnea. Even if you use it 4 hours it helps. Occasionally all of us are unable to use a medical therapy, in sleep apnea, it is not dangerous to miss one night.   Communicate. Call our skilled team on the number provided on the first day if your visit for problems that make it difficult to wear the device. Over 2 out of 3 patients can learn to wear the device long-term with help from our team. Remember to call our team or your sleep providers if you are unable to wear the device as we may have other solutions for those who cannot adapt to mask CPAP therapy. It is recommended that you sleep your sleep provider within the first 3 months and yearly after that if you are not having problems.   Use it for your health. We encourage use of CPAP masks during daytime quiet periods to allow your face and brain to adapt to the sensation of CPAP so that it will be a more natural sensation to awaken to at night or during naps. This can be very useful during the first few weeks or months of adapting to CPAP though it does not help medically to wear CPAP during wakefulness and  should not be used as a strategy just to meet guidelines.  Take care of your equipment. Make sure you clean your mask and tubing using directions every day and that your filter and mask are replaced as recommended or if they are not working.     BESIDES CPAP, WHAT OTHER THERAPIES ARE THERE?    Positioning Device  Positioning devices are generally used when sleep apnea is mild and only occurs on your back.This example shows a pillow that straps around the waist. It may be appropriate for those whose sleep study shows milder  sleep apnea that occurs primarily when lying flat on one's back. Preliminary studies have shown benefit but effectiveness at home may need to be verified by a home sleep test. These devices are generally not covered by medical insurance.  Examples of devices that maintain sleeping on the back to prevent snoring and mild sleep apnea.    Belt type body positioner  http://[employer].com.Bikmo/    Electronic reminder  http://nightshifttherapy.com/            Oral Appliance  What is oral appliance therapy?  An oral appliance device fits on your teeth at night like a retainer used after having braces. The device is made by a specialized dentist and requires several visits over 1-2 months before a manufactured device is made to fit your teeth and is adjusted to prevent your sleep apnea. Once an oral device is working properly, snoring should be improved. A home sleep test may be recommended at that time if to determine whether the sleep apnea is adequately treated.       Some things to remember:  -Oral devices are often, but not always, covered by your medical insurance. Be sure to check with your insurance provider.   -If you are referred for oral therapy, you will be given a list of specialized dentists to consider or you may choose to visit the Web site of the American Academy of Dental Sleep Medicine  -Oral devices are less likely to work if you have severe sleep apnea or are extremely overweight.     More detailed information  An oral appliance is a small acrylic device that fits over the upper and lower teeth  (similar to a retainer or a mouth guard). This device slightly moves jaw forward, which moves the base of the tongue forward, opens the airway, improves breathing for effective treat snoring and obstructive sleep apnea in perhaps 7 out of 10 people .  The best working devices are custom-made by a dental device  after a mold is made of the teeth 1, 2, 3.  When is an oral appliance indicated?  Oral appliance  therapy is recommended as a first-line treatment for patients with primary snoring, mild sleep apnea, and for patients with moderate sleep apnea who prefer appliance therapy to use of CPAP4, 5. Severity of sleep apnea is determined by sleep testing and is based on the number of respiratory events per hour of sleep.   How successful is oral appliance therapy?  The success rate of oral appliance therapy in patients with mild sleep apnea is 75-80% while in patients with moderate sleep apnea it is 50-70%. The chance of success in patients with severe sleep apnea is 40-50%. The research also shows that oral appliances have a beneficial effect on the cardiovascular health of GRUPO patients at the same magnitude as CPAP therapy7.  Oral appliances should be a second-line treatment in cases of severe sleep apnea, but if not completely successful then a combination therapy utilizing CPAP plus oral appliance therapy may be effective. Oral appliances tend to be effective in a broad range of patients although studies show that the patients who have the highest success are females, younger patients, those with milder disease, and less severe obesity. 3, 6.   Finding a dentist that practices dental sleep medicine  Specific training is available through the American Academy of Dental Sleep Medicine for dentists interested in working in the field of sleep. To find a dentist who is educated in the field of sleep and the use of oral appliances, near you, visit the Web site of the American Academy of Dental Sleep Medicine.    References  1. Ary et al. Objectively measured vs self-reported compliance during oral appliance therapy for sleep-disordered breathing. Chest 2013; 144(5): 4593-6110.  2. Viral et al. Objective measurement of compliance during oral appliance therapy for sleep-disordered breathing. Thorax 2013; 68(1): 91-96.  3. Donnie et al. Mandibular advancement devices in 620 men and women with GRUPO and snoring:  tolerability and predictors of treatment success. Chest 2004; 125: 6893-1669.  4. Yonathan et al. Oral appliances for snoring and GRUPO: a review. Sleep 2006; 29: 244-262.  5. Donte et al. Oral appliance treatment for GRUPO: an update. J Clin Sleep Med 2014; 10(2): 215-227.  6. Myra et al. Predictors of OSAH treatment outcome. J Dent Res 2007; 86: 6057-3275.      Weight Loss:    Weight loss is a long-term strategy that may improve sleep apnea in some patients.    Weight management is a personal decision and the decision should be based on your interest and the potential benefits.  If you are interested in exploring weight loss strategies, the following discussion covers the impact on weight loss on sleep apnea and the approaches that may be successful.    Being overweight does not necessarily mean you will have health consequences.  Those who have BMI over 35 or over 27 with existing medical conditions carries greater risk.   Weight loss decreases severity of sleep apnea in most people with obesity. For those with mild obesity who have developed snoring with weight gain, even 15-30 pound weight loss can improve and occasionally eliminate sleep apnea.  Structured and life-long dietary and health habits are necessary to lose weight and keep healthier weight levels.     Though there may be significant health benefits from weight loss, long-term weight loss is very difficult to achieve- studies show success with dietary management in less than 10% of people. In addition, substantial weight loss may require years of dietary control and may be difficult if patients have severe obesity. In these cases, surgical management may be considered.  Finally, older individuals who have tolerated obesity without health complications may be less likely to benefit from weight loss strategies.      BMI 35    Surgery:    Surgery for obstructive sleep apnea is considered generally only when other therapies fail to work. Surgery  may be discussed with you if you are having a difficult time tolerating CPAP and or when there is an abnormal structure that requires surgical correction.  Nose and throat surgeries often enlarge the airway to prevent collapse.  Most of these surgeries create pain for 1-2 weeks and up to half of the most common surgeries are not effective throughout life.  You should carefully discuss the benefits and drawbacks to surgery with your sleep provider and surgeon to determine if it is the best solution for you.   More information  Surgery for GRUPO is directed at areas that are responsible for narrowing or complete obstruction of the airway during sleep.  There are a wide range of procedures available to enlarge and/or stabilize the airway to prevent blockage of breathing in the three major areas where it can occur: the palate, tongue, and nasal regions.  Successful surgical treatment depends on the accurate identification of the factors responsible for obstructive sleep apnea in each person.  A personalized approach is required because there is no single treatment that works well for everyone.  Because of anatomic variation, consultation with an examination by a sleep surgeon is a critical first step in determining what surgical options are best for each patient.  In some cases, examination during sedation may be recommended in order to guide the selection of procedures.  Patients will be counseled about risks and benefits as well as the typical recovery course after surgery. Surgery is typically not a cure for a person s GRUPO.  However, surgery will often significantly improve one s GRUPO severity (termed  success rate ).  Even in the absence of a cure, surgery will decrease the cardiovascular risk associated with OSA7; improve overall quality of life8 (sleepiness, functionality, sleep quality, etc).      Palate Procedures:  Patients with GRUPO often have narrowing of their airway in the region of their tonsils and uvula.   The goals of palate procedures are to widen the airway in this region as well as to help the tissues resist collapse.  Modern palate procedure techniques focus on tissue conservation and soft tissue rearrangement, rather than tissue removal.  Often the uvula is preserved in this procedure. Residual sleep apnea is common in patient after pharyngoplasty with an average reduction in sleep apnea events of 33%2.      Tongue Procedures:  ExamWhile patients are awake, the muscles that surround the throat are active and keep this region open for breathing. These muscles relax during sleep, allowing the tongue and other structures to collapse and block breathing.  There are several different tongue procedures available.  Selection of a tongue base procedure depends on characteristics seen on physical exam.  Generally, procedures are aimed at removing bulky tissues in this area or preventing the back of the tongue from falling back during sleep.  Success rates for tongue surgery range from 50-62%3.    Hypoglossal Nerve Stimulation:  Hypoglossal nerve stimulation has recently received approval from the [place] Food and Drug Administration for the treatment of obstructive sleep apnea.  This is based on research showing that the system was safe and effective in treating sleep apnea6.  Results showed that the median AHI score decreased 68%, from 29.3 to 9.0. This therapy uses an implant system that senses breathing patterns and delivers mild stimulation to airway muscles, which keeps the airway open during sleep.  The system consists of three fully implanted components: a small generator (similar in size to a pacemaker), a breathing sensor, and a stimulation lead.  Using a small handheld remote, a patient turns the therapy on before bed and off upon awakening.    Candidates for this device must be greater than 18 years of age, have moderate to severe GRUPO (AHI between 15-65), BMI less than 35, have tried CPAP/oral appliance  for at least 8 weeks without success, and have appropriate upper airway anatomy (determined by a sleep endoscopy performed by Dr. Don Redman).    Hypoglossal Nerve Stimulation Pathway:    The sleep surgeon s office will work with the patient through the insurance prior-authorization process (including communications and appeals).    Nasal Procedures:  Nasal obstruction can interfere with nasal breathing during the day and night.  Studies have shown that relief of nasal obstruction can improve the ability of some patients to tolerate positive airway pressure therapy for obstructive sleep apnea1.  Treatment options include medications such as nasal saline, topical corticosteroid and antihistamine sprays, and oral medications such as antihistamines or decongestants. Non-surgical treatments can include external nasal dilators for selected patients. If these are not successful by themselves, surgery can improve the nasal airway either alone or in combination with these other options.      Combination Procedures:  Combination of surgical procedures and other treatments may be recommended, particularly if patients have more than one area of narrowing or persistent positional disease.  The success rate of combination surgery ranges from 66-80%2,3.    References  Ronald FRIEND. The Role of the Nose in Snoring and Obstructive Sleep Apnoea: An Update.  Eur Arch Otorhinolaryngol. 2011; 268: 1365-73.   Capniyah SM; Alize JA; Gladis JR; Pallanch JF; Duong MB; Edin SG; Amanda RIVERA. Surgical modifications of the upper airway for obstructive sleep apnea in adults: a systematic review and meta-analysis. SLEEP 2010;33(10):4470-4332. Eva PHIPPS. Hypopharyngeal surgery in obstructive sleep apnea: an evidence-based medicine review.  Arch Otolaryngol Head Neck Surg. 2006 Feb;132(2):206-13.  Lalit YH1, Zena Y, Shahab TANA. The efficacy of anatomically based multilevel surgery for obstructive sleep apnea. Otolaryngol Head Neck Surg. 2003  Oct;129(4):327-35.  Kezirian E, Goldberg A. Hypopharyngeal Surgery in Obstructive Sleep Apnea: An Evidence-Based Medicine Review. Arch Otolaryngol Head Neck Surg. 2006 Feb;132(2):206-13.  Nannette PRICE et al. Upper-Airway Stimulation for Obstructive Sleep Apnea.  N Engl J Med. 2014 Jan 9;370(2):139-49.  Vianney Y et al. Increased Incidence of Cardiovascular Disease in Middle-aged Men with Obstructive Sleep Apnea. Am J Respir Crit Care Med; 2002 166: 159-165  Chiu EM et al. Studying Life Effects and Effectiveness of Palatopharyngoplasty (SLEEP) study: Subjective Outcomes of Isolated Uvulopalatopharyngoplasty. Otolaryngol Head Neck Surg. 2011; 144: 623-631.        WHAT IF I ONLY HAVE SNORING?    Mandibular advancement devices, lateral sleep positioning, long-term weight loss and treatment of nasal allergies have been shown to improve snoring.  Exercising tongue muscles with a game (https://[employer]limit/yvon/soundly-reduce-snoring/yj1523769068) or stimulating the tongue during the day with a device (https://doi.org/10.3390/gts51293367) have improved snoring in some individuals.    Remember to Drive Safe... Drive Alive     Sleep health profoundly affects your health, mood, and your safety.  Thirty three percent of the population (one in three of us) is not getting enough sleep and many have a sleep disorder. Not getting enough sleep or having an untreated / undertreated sleep condition may make us sleepy without even knowing it. In fact, our driving could be dramatically impaired due to our sleep health. As your provider, here are some things I would like you to know about driving:     Here are some warning signs for impairment and dangerous drowsy driving:              -Having been awake more than 16 hours               -Looking tired               -Eyelid drooping              -Head nodding (it could be too late at this point)              -Driving for more than 30 minutes     Some things you could do to make the  driving safer if you are experiencing some drowsiness:              -Stop driving and rest              -Call for transportation              -Make sure your sleep disorder is adequately treated     Some things that have been shown NOT to work when experiencing drowsiness while driving:              -Turning on the radio              -Opening windows              -Eating any  distracting  /  entertaining  foods (e.g., sunflower seeds, candy, or any other)              -Talking on the phone      Your decision may not only impact your life, but also the life of others. Please, remember to drive safe for yourself and all of us.          General recommendations for sleep problems (Insomnia)  Allow 2-4 weeks to see results     Establish a regular sleep schedule    Most people only need 7-8 hours of sleep.  Don't be in bed longer than you need     to sleep or you will end up spending more time awake in bed. This trains your    brain to think of the bed as a place to not sleep.  Go to bed at same time each night   Get up at same time each day - Set an alarm everyday (even weekends). This is one of    the most important tips. It prevents you from relying on your insomnia to get you    up on time for your day. That actually reinforces insomnia. It also will help your    body get into a pattern where you start feeling tired at a consistent time each    night.  The body functions best when you keep a consistent routine.  Avoid sleeping-in and napping. Anytime you sleep during the day, you will be less tired at    night. You may be tired enough to fall asleep, but you will wake more in the    middle of the night because you will have met your sleep need before the night is    done.   Cut down time in bed (if not asleep, get up)- Use your bed only for sleep and sex    Anytime you spend time in bed doing activities other than sleep (reading,    watching TV, working, playing on the computer or phone, or even just laying in    bed  trying to sleep), you are training  your brain to think of the bed as a place to    do activities other than sleep. If you are not falling asleep within 20-30 minutes,    get out of bed. While out of bed, avoid bright lights. Avoid work or chores. Being    productive in the middle of the night reinforces waking up at night. Find relaxing,    not particularly entertaining activities like reading, listening to music, or relaxation    exercises. Go back to bed if you start feeling groggy, or after about 30 minutes,    even if not feeling very tired. Sometimes, just getting out of bed stops the pattern    of getting frustrated about laying in bed not sleeping, and that can help you fall    asleep.   Avoid trying to force yourself to sleep- sleep is not like everything else. The harder you    work at most things, the more you can accomplish. The harder you work at    sleep, the less you will sleep.     Make the bedroom comfortable - quiet, dark and cool are better. Consider ear plugs    (silicon). Use dark blinds or wear an eye mask if needed     Make a relaxing routine prior to bedtime  Relaxation exercises:   Progressive muscle relaxation: Relax each muscle group individually    Begin with your feet, flex, then relax. Try to imagine your feet feeling heavy and sinking into the bed. Move to your calves, do the same thing. Work through each muscle group toward your head.    Relaxing Mental Imagery: Try to imagine a trip that you took and found relaxing, or imagine a day at the beach. Try to walk yourself through the day in your mind as if you were dreaming it. Try to imagine sensing the different experiences, such as feeling sand between your toes, the heat of the sun on your skin, seeing the waves crashing the shore, the smell of the salt water, etc.     Deal with your worries before bedtime    Set aside a worry time around dinner time for 10-15 minutes. Write down the    things that are on your mind. Plan time in the  coming days to address those    issues. Brainstorm ideas on how you will deal with them. Try to identify issues    that are out of your control, and try to let those issues go.  Listen to relaxation tapes   Classical Music or Nature sounds   Back Massage   Get regular exercise each day (at least 1-2 hours before bedtime)   Take medications only as directed   Eat a light bedtime snack or warm drink   Warm milk   Warm herbal tea (non-caffeinated)       Things to avoid   No overstimulating activities just before bed   No competitive games before bedtime   No exciting television programs before bedtime   Avoid caffeine after lunchtime   Avoid chocolate   Do not use alcohol to induce sleep (worsens Insomnia)   Do not take someone else's sleeping pills   Do not look at the clock when awakening   Do not turn on light when getting up to use bathroom, use a nightlight     Online Programs   www.SHUTU.S. [employer] (pronounced shut eye). There is a fee for this program. Enter the code  [place]  if you decide to enroll in this program.    www.sleepIO.com (pronounced sleep ee oh). There is a fee for this program. Enter the code  [place]  if you decide to enroll in this program.     Suggested Resources  Insomnia Treatment Books   Overcoming Insomnia by Sigfiredo Hines and Jessica Macario (2008)  No More Sleepless Nights by Pa Millan and Carmen Cazares (1996)  Say Cece to Insomnia by Brayan Florez (2009)  The Insomnia Workbook by Gisela Aguirre and Socrates Love (2009)  The Insomnia Answer by Alejandro Bennett and Grady Gupta (2006)      Stress Management and Relaxation Books  The Relaxation and Stress Reduction Workbook by Claudia Cifuentes, Arlyn Trimble and Laci Mars (2008)  Stress Management Workbook: Techniques and Self-Assessment Procedures by Milagros Hernandez and Janes Sigala (1997)  A Mindfulness-Based Stress Reduction Workbook by Brody Plunkett and Evelyn Hall (2010)  The Complete Stress Management  Workbook by Robbin Ryan and Micheal Agudelo (1996)  Assert Yourself by Asmita Raines and Tucker Raines (1977)    Relaxation Resources for Computer Download   These websites offer resources to help you relax. This list is for information only. [facility] is not responsible for the quality of services or the actions of any person or organization.  Progressive Muscle Relaxation (PMR):   http://www.[employer]/progressive-muscle-relaxation-exercise.html   http://studentsupport.[facility]/counseling/resources/[email or web address]/relaxation-and-stress-management/   Deep Breathing Exercises:  http://www.[employer]/breathing-awareness.html     Meditation:   [employer]  www.Smarter PocketsguidedActuatedMedicalmeditation-site.[employer] You may have to pay for some of these resources.    Guided Imagery:  http://www.[employer]/guided-imagery-scripts.html   http://[employer]/library/yrgoqazpqw-lfzqhl-xooscia/   Consider phone apps such as: Calm, Headspace or Insight Timer.    Counseling / Behavioral Health  [facility]  Visit [email or web address] or call [phone] to find a clinic close to you.  Or call [phone] for [facility].

## 2023-09-08 NOTE — PROGRESS NOTES
Virtual Visit Details    Type of service:  Video Visit   Start Time: 2:32 PM   End Time: 3:34 PM    Originating Location (pt. Location): Other outside    Distant Location (provider location):  Off-site  Platform used for Video Visit: [facility]    Outpatient Sleep Medicine Consultation:      Name: Kayy Jenkins MRN# [id number]   Age: 28 year old YOB: 1995     Date of Consultation: September 7, 2023  Consultation is requested by: Rolando Jain PA-C  [address] [place] [id number] Rolando Jain  Primary care provider: Rolando Jain       Reason for Sleep Consult:     Kayy Jenkins is sent by Rolando Jain for a sleep consultation regarding daytime sleepiness.    Patient s Reason for visit  Kayy Jenkins main reason for visit: Always tired no matter how much sleep I get, also weird and traumatic dreams, i have gotten violent in my sleep  Patient states problem(s) started: Since I was little  Kayy Jenkins's goals for this visit: Hopefully find out the cause and a solution for better sleep and function           Assessment and Plan:     Summary Sleep Diagnoses and Recommendations:  (R06.83) Snoring  (primary encounter diagnosis), (R40.0) Daytime sleepiness, (Z68.35) BMI 35.0-35.9,adult  Comment: Kayy presents with a numerous sleep concerns. There is mild to moderate risk for GRUPO. She snores, although she thinks only mildly. She has daytime sleepiness after meals, occasionally at work and in the evening. Her ESS is 16/24. STOP BANG TOTAL: 3 snoring, tired (ESS 16/24), BMI 35. She is not told that she stops breathing in her sleep. Other negative risk factors include: age <50 (28), female gender.  Plan:    watchPAT One. Given that she lives in [place], we will do the home sleep study that can be mailed to her.      (G47.50) Parasomnia, unspecified type  Comment: Kayy has a history of nightmares and dream enactment behavior going back to  childhood. She does have a history of PTSD. Her last episode was about 4 months ago. She grabbed her fiance's shoulder and he had to wake her. Sometimes her dreams relate to prior trauma and she gets more events around the time of year that she experienced that trauma. Other times, she can have nightmares due to watching a scary movie. She denies anosmia. She is not currently on antidepressants. She does have a history of depression, anxiety and borderline personality. She did not respond well to prior trials of antidepressants.  Plan: We reviewed dream/image rehearsal therapy. Avoid movies that trigger nightmares. May consider a trial of prazosin during the month when her nightmares get worse. We talked about arranging the sleeping environment to reduce risk of injury.    (F45.8) Bruxism  Comment: Dentist advised a visit with a sleep provider. Bruxism is sometimes associated with apnea.  Plan: We will continue to monitor symptoms. If she does not have apnea or continues to have bruxing despite treatment of apnea, we will refer her back to dentistry for a bite guard if indicated.    (F51.04) Chronic insomnia  Comment: Kayy has almost a lifelong history of difficulty falling asleep and staying asleep. I suspect this is largely driven by her mental health, but she may have apnea contributing, as well as psychophysiological contributions, inconsistent sleep patterns and frequent daytime dozing. She may also have a mild delayed sleep preference.  Plan: We discussed trying to keep a consistent sleep schedule, aiming for no more than 8 hours in bed. We will work on this further after her sleep study.       Comorbid Diagnoses:  Depression/anxiety, PTSD, borderline personality disorder, BMI 35    Summary Counseling:    Sleep Testing Reviewed  Obstructive Sleep Apnea Reviewed  Complications of Untreated Sleep Apnea Reviewed      Patient will follow up 2 weeks after sleep study.  Bennett Goltz, PA-C      Total time spent  reviewing medical records, history and physical examination, review of previous testing and interpretation as well as documentation on this date:74 minutes    CC: Rolando Jain          History of Present Illness:     Kayy presents with multiple sleep concerns. She has difficulty falling asleep and staying asleep since she was a child. She has a lot of bad dreams. Currently she may have 1-2 nightmares every couple of months, but it was a lot more in the past. She has a history of kicking and punching the wall (as recently as about 4 months ago) or her mother (when she was a child) while having a nightmare. She has bruxism as well.     Her insomnia is long standing. In her 20's, she felt she couldn't sleep at all. She was taking a sleeping medication that made it really hard to wake, but she can't recall what it was. Her insomnia is comorbid with mental health issues. She has been on 4 or 5 different medications for depression/anxiety in 2017. She does not remember what she tried. She would wake with hangover headaches and had dizziness and balance issues. She felt she was about to fall over when just standing. Currently, she gets up 1-5 times per night for the restroom. Her mind races at bedtime, it can take a couple of hours to fall asleep. She may think about work or life stresses.    She is always tired. She has an inactive job and will nod off. That has been happening for several years, unsure exactly when that started. She watches TV after dinner and often dozes after 2-3 episodes. She does snore but does not think it is that loud. She denies a history of observed apnea.       Past Sleep Evaluations: none    SLEEP-WAKE SCHEDULE:     Work/School Days: Patient goes to school/work: Yes   Usually gets into bed at Anywhere from 8pm-10 pm  Takes patient about 2 hours if my body is awake, if im exhausted ill pass out in a few minutes to fall asleep  Has trouble falling asleep 3-4 nights per week  Wakes up in the  middle of the night 1-5 depending times.  Wakes up due to Pain;External stimuli (bed partner, pets, noise, etc);Use the bathroom;Nightmares;Uncertain. She wakes mostly for the restroom. That improved with being on Vesicare, but that medication made her feel sick.  She has trouble falling back asleep 1 times a week.   It usually takes 1-2 to get back to sleep  Patient is usually up at 5 am, works at 6 AM. She does not usually have too much trouble waking to her alarm. Occasionally, she may hit snooze for 15 minutes.  Uses alarm: Yes    Weekends/Non-work Days/All Other Days:  Usually gets into bed at 8-10pm Sometimes she may stay up to 11:30 PM to midnight.  Takes patient about A couple hours or minutes to fall asleep  Patient is usually up at 6-7:30 AM  Uses alarm: Yes. Without an alarm, she thinks she could sleep until 10 AM (sleeping off and on towards the end of the night). She thinks she has better sleep quality in the first half of the night. She thinks her natural sleep schedule would be about 9 PM to 9 AM.     Sleep Need  Patient gets  Sometimes 6hrs or less if im dioni ill get 9 sleep on average   Patient thinks she needs about At least 9 to function sleep    Kayy Jenkins prefers to sleep in this position(s): Back;Side;Head Elevated   Patient states they do the following activities in bed: Use phone, computer, or tablet (30-60 minutes)    Naps  Patient takes a purposeful nap 1 if needed times a week and naps are usually 30 minutes to an hour depending on how tired I am in duration  She feels better after a nap: No  She dozes off unintentionally A couple times during work days per week. She dozes in the evening a couple of times per night for 5-10 minutes. She will get drowsy after every meal.   Patient has had a driving accident or near-miss due to sleepiness/drowsiness: No      SLEEP DISRUPTIONS:    Breathing/Snoring  Patient snores:  yes, but not really loud  Other people complain about her  snoring:   no  Patient has been told she stops breathing in her sleep:  no  She has issues with the following:   She wakes with headaches daily. She takes Nurtec and it goes away, but then it comes back later in the day. Independent of headaches, she says her head feels weird and dizzy/foggy and has some pressure right around the frontal sinus. That does not correlate with her tiredness. It is random. She does have heartburn regularly, day and night. She frequently wakes congested, but can breathe through her nose when she first goes to bed.    Movement:  Patient gets pain, discomfort, with an urge to move:    She sometimes gets some numb/tingling or tightness in her legs, often related to not moving around a lot. No clear restless legs symptoms.   It happens when she is resting:     It happens more at night:     Patient has been told she kicks her legs at night:    about 7 months ago, she kicked the wall in her sleep. She thinks that was around 12-1 AM.      Behaviors in Sleep:  Kayy Jenkins has experienced the following behaviors while sleeping:   She has bruxism. Her dentist suggested talking to a sleep provider. Nightmares with dream enactment behavior. She has a history of PTSD. Some of her nightmares related to prior trauma. Some nightmares may relate to movies she watched or fears she has.  She denies anosmia.  She tends to have more nightmares around the time of year when she had her prior traumatic experience.  Pt denies sleep walking. Pt denies sleep paralysis and cataplexy.   Mostly when younger, she has thought that she saw something in the room. She will look at it for a few minutes to make sure she is just imagining things. She denies any vivid hallucinations.    Is there anything else you would like your sleep provider to know:          CAFFEINE AND OTHER SUBSTANCES:    Patient consumes caffeinated beverages per day:   none, it made her more tired  Last caffeine use is usually:    List of  any prescribed or over the counter stimulants that patient takes:  B vitamins occ-don't help though  List of any prescribed or over the counter sleep medication patient takes:  none  List of previous sleep medications that patient has tried:   amitriptyline 10 mg for headaches mostly, but a little for sleep as well.  Patient drinks alcohol to help them sleep:   no  Patient drinks alcohol near bedtime:  no    Family History:  Patient has a family member been diagnosed with a sleep disorder:      None known    Social History:  She inspects medical tubes.  She lives with her fiance.    SCALES:    EPWORTH SLEEPINESS SCALE         9/8/2023     2:23 PM    [place] Sleepiness Scale ( CASANDRA Molina  [id number]<br>ESS - [place]/English - Final version - 21 Nov 07 - [facility] [place].)   Sitting and reading High chance of dozing   Watching TV Moderate chance of dozing   Sitting, inactive in a public place (e.g. a theatre or a meeting) Moderate chance of dozing   As a passenger in a car for an hour without a break Moderate chance of dozing   Lying down to rest in the afternoon when circumstances permit High chance of dozing   Sitting and talking to someone Slight chance of dozing   Sitting quietly after a lunch without alcohol High chance of dozing   In a car, while stopped for a few minutes in traffic Would never doze   [place] Score (MC) 16   [place] Score (Sleep) 16         INSOMNIA SEVERITY INDEX (MAXIMO)          9/8/2023    12:22 PM   Insomnia Severity Index (MAXIMO)   Difficulty falling asleep 3   Difficulty staying asleep 2   Problems waking up too early 3   How SATISFIED/DISSATISFIED are you with your CURRENT sleep pattern? 4   How NOTICEABLE to others do you think your sleep problem is in terms of impairing the quality of your life? 3   How WORRIED/DISTRESSED are you about your current sleep problem? 4   To what extent do you consider your sleep problem to INTERFERE with your daily functioning (e.g. daytime fatigue, mood,  ability to function at work/daily chores, concentration, memory, mood, etc.) CURRENTLY? 4   MAXIMO Total Score 23       Guidelines for Scoring/Interpretation:  Total score categories:  0-7 = No clinically significant insomnia   8-14 = Subthreshold insomnia   15-21 = Clinical insomnia (moderate severity)  22-28 = Clinical insomnia (severe)  Used via courtesy of www.[employer].va.gov with permission from Socrates Love PhD., [facility]      STOP BANG         9/8/2023     2:24 PM   STOP BANG Questionnaire (  2008, the American Society of Anesthesiologists, Inc. [employer]   1. Snoring - Do you snore loudly (louder than talking or loud enough to be heard through closed doors)? No   2. Tired - Do you often feel tired, fatigued, or sleepy during daytime? Yes   3. Observed - Has anyone observed you stop breathing during your sleep? No   4. Blood pressure - Do you have or are you being treated for high blood pressure? No   5. BMI - BMI more than 35 kg/m2? Yes   6. Age - Age over 50 yr old? No   7. Neck circumference - Neck circumference greater than 40 cm? No   8. Gender - Gender male? No   STOP BANG Score (MC): 3 (High risk of GRUPO)         GAD7        8/31/2023     3:35 PM   SUSAN-7    1. Feeling nervous, anxious, or on edge 1   2. Not being able to stop or control worrying 1   3. Worrying too much about different things 1   4. Trouble relaxing 3   5. Being so restless that it is hard to sit still 0   6. Becoming easily annoyed or irritable 1   7. Feeling afraid, as if something awful might happen 0   SUSAN-7 Total Score 7   If you checked any problems, how difficult have they made it for you to do your work, take care of things at home, or get along with other people? Not difficult at all         CAGE-AID        5/17/2023     1:00 PM   CAGE-AID Flowsheet   Have you ever felt you should Cut down on your drinking or drug use?    Have people Annoyed you by criticizing your drinking or drug use?   "  Have you ever felt bad or Guilty about your drinking or drug use?    Have you ever had a drink or used drugs first thing in the morning to steady your nerves or to get rid of a hangover? (Eye opener)    CAGE-AID SCORE        Information is confidential and restricted. Go to Review Flowsheets to unlock data.       CAGE-AID reprinted with permission from the [place] Medical Journal, ALEJANDRA Frey. and SHAHLA Angulo, \"Conjoint screening questionnaires for alcohol and drug abuse\" [place] Medical Journal 94: 135-140, 1995.      PATIENT HEALTH QUESTIONNAIRE-9 (PHQ - 9)        8/31/2023     3:34 PM   PHQ-9 ([employer])   1.  Little interest or pleasure in doing things 0   2.  Feeling down, depressed, or hopeless 1   3.  Trouble falling or staying asleep, or sleeping too much 0   4.  Feeling tired or having little energy 3   5.  Poor appetite or overeating 0   6.  Feeling bad about yourself - or that you are a failure or have let yourself or your family down 1   7.  Trouble concentrating on things, such as reading the newspaper or watching television 0   8.  Moving or speaking so slowly that other people could have noticed. Or the opposite - being so fidgety or restless that you have been moving around a lot more than usual 3   9.  Thoughts that you would be better off dead, or of hurting yourself in some way 0   PHQ-9 Total Score 8   6.  Feeling bad about yourself 1   7.  Trouble concentrating 0   8.  Moving slowly or restless 3   9.  Suicidal or self-harm thoughts 0   1.  Little interest or pleasure in doing things Not at all   2.  Feeling down, depressed, or hopeless Several days   3.  Trouble falling or staying asleep, or sleeping too much Not at all   4.  Feeling tired or having little energy Nearly every day   5.  Poor appetite or overeating Not at all   6.  Feeling bad about yourself Several days   7.  Trouble concentrating Not at all   8.  Moving slowly or restless Nearly every day   9.  Suicidal or self-harm thoughts " Not at all   PHQ-9 via [employer] TOTAL SCORE-----> 8 (Mild depression)   Difficulty at work, home, or with people Somewhat difficult       Developed by Jadyn Hurtado, Natalie Underwood, Quinn Forte and colleagues, with an educational antione from [employer] No permission required to reproduce, translate, display or distribute.        Allergies:    Allergies   Allergen Reactions    Amoxicillin Rash       Medications:    Current Outpatient Medications   Medication Sig Dispense Refill    loperamide (IMODIUM A-D) 2 MG tablet Take 1 tablet (2 mg) by mouth 4 times daily as needed for diarrhea 30 tablet 0    rimegepant (NURTEC) 75 MG ODT tablet Place 1 tablet (75 mg) under the tongue every other day for migraine prevention 16 tablet 4       Problem List:  Patient Active Problem List    Diagnosis Date Noted    Urinary frequency 08/02/2023     Priority: Medium    Pelvic floor dysfunction 08/02/2023     Priority: Medium    Dyspareunia in female 08/02/2023     Priority: Medium    Anxiety 05/17/2023     Priority: Medium    MDD (major depressive disorder) 05/17/2023     Priority: Medium    Borderline personality disorder (H) 05/17/2023     Priority: Medium    Posttraumatic stress disorder 05/17/2023     Priority: Medium        Past Medical/Surgical History:  Past Medical History:   Diagnosis Date    Anxiety     Borderline personality disorder (H)     Major depression     PTSD (post-traumatic stress disorder)      Past Surgical History:   Procedure Laterality Date    wisdom teeth         Social History:  Social History     Socioeconomic History    Marital status: Single     Spouse name: Not on file    Number of children: Not on file    Years of education: Not on file    Highest education level: Not on file   Occupational History    Not on file   Tobacco Use    Smoking status: Former     Types: Cigarettes     Passive exposure: Never    Smokeless tobacco: Never   Vaping Use    Vaping Use: Never used   Substance and Sexual  "Activity    Alcohol use: Yes    Drug use: Not Currently     Types: Marijuana    Sexual activity: Not Currently   Other Topics Concern    Not on file   Social History Narrative    Not on file     Social Determinants of Health     Financial Resource Strain: Not on file   Food Insecurity: Not on file   Transportation Needs: Not on file   Physical Activity: Not on file   Stress: Not on file   Social Connections: Not on file   Intimate Partner Violence: Not on file   Housing Stability: Not on file       Family History:  Family History   Problem Relation Age of Onset    Anxiety Disorder Mother     Depression Mother     Post-Traumatic Stress Disorder (PTSD) Father        Review of Systems:  A complete review of systems reviewed by me is negative with the exeption of what has been mentioned in the history of present illness.        Physical Examination:  Vitals: Ht 1.6 m (5' 3\")   Wt 90.7 kg (200 lb)   LMP 08/07/2023 (Approximate)   BMI 35.43 kg/m    BMI= Body mass index is 35.43 kg/m .           GENERAL APPEARANCE: healthy, alert, no distress, and cooperative  EYES: Eyes grossly normal to inspection, PERRL, and conjunctivae and sclerae normal  HENT: oropharynx crowded and tongue base enlarged and sits high in mouth  NECK: no asymmetry, masses, or scars  RESP: no apparent respiratory distress (retractions or difficulty speaking in full sentences), no audible wheeze or cough   Mallampati Class: IV.  Tonsillar Stage: could not observe.         Data: All pertinent previous laboratory data reviewed     Recent Labs   Lab Test 04/27/23  1708      POTASSIUM 4.2   CHLORIDE 105   CO2 27   ANIONGAP 8   *   BUN 11.2   CR 0.63   ANASTASIYA 9.0       Recent Labs   Lab Test 04/27/23  1708   WBC 6.7   RBC 4.31   HGB 13.1   HCT 39.0   MCV 91   MCH 30.4   MCHC 33.6   RDW 13.7          No results for input(s): PROTTOTAL, ALBUMIN, BILITOTAL, ALKPHOS, AST, ALT, BILIDIRECT in the last 39237 hours.    TSH (uIU/mL)   Date Value "   04/27/2023 2.17       No results found for: UAMP, UBARB, BENZODIAZEUR, UCANN, UCOC, OPIT, UPCP    No results found for: IRONSAT, IX19783, VERONICA    No results found for: PH, PHARTERIAL, PO2, UD2OZJLXALA, SAT, PCO2, HCO3, BASEEXCESS, SAIDA, BEB    @LABRCNTIPR(phv:4,pco2v:4,po2v:4,hco3v:4,jodee:4,o2per:4)@    Echocardiology: No results found for this or any previous visit (from the past 4320 hour(s)).    Chest x-ray: No results found for this or any previous visit from the past 365 days.      Chest CT: No results found for this or any previous visit from the past 365 days.      PFT: Most Recent [facility] Pulmonary Function Testing    No results found for: 20001  No results found for: 20002  No results found for: 20003  No results found for: 20015  No results found for: 20016  No results found for: 20027  No results found for: 20028  No results found for: 20029  No results found for: 20079  No results found for: 20080  No results found for: 20081  No results found for: 20335  No results found for: 20105  No results found for: 20053  No results found for: 20054  No results found for: 20055      Bennett Ezra Goltz, PA-C, JOCY 9/7/2023

## 2023-09-09 ENCOUNTER — MYC MEDICAL ADVICE (OUTPATIENT)
Dept: FAMILY MEDICINE | Facility: OTHER | Age: 28
End: 2023-09-09
Payer: COMMERCIAL

## 2023-09-09 ENCOUNTER — NURSE TRIAGE (OUTPATIENT)
Dept: NURSING | Facility: CLINIC | Age: 28
End: 2023-09-09
Payer: COMMERCIAL

## 2023-09-09 NOTE — TELEPHONE ENCOUNTER
Nurse Triage SBAR    Is this a 2nd Level Triage? NO    Situation: Per pt, she thinks she might be pregnant and having miscarriage.    Background: Per pt. Her heavy bleeding started on 09/08/2023, per pt she has large blood clots that have been present 2 or more hours, per pt she was soaking 2 or more pad recently also.  Per pt she had light pink tissue come out  of vagina yesterday as well.  Assessment: Pt denied any LOC, any SOB or chest pain.    Protocol Recommended Disposition:   Go to ED Now    Recommendation: Per pt, she'll go to [facility] ED in [place].          Does the patient meet one of the following criteria for ADS visit consideration? 16+ years old, with an [facility] PCP     TIP  Providers, please consider if this condition is appropriate for management at one of our Acute and Diagnostic Services sites.     If patient is a good candidate, please use dotphrase <dot>triageresponse and select Refer to ADS to document.    Reason for Disposition   SEVERE vaginal bleeding (e.g., soaking 2 pads per hour or large blood clots and present 2 or more hours)    Additional Information   Negative: Shock suspected (e.g., cold/pale/clammy skin, too weak to stand, low BP, rapid pulse)   Negative: Difficult to awaken or acting confused (e.g., disoriented, slurred speech)   Negative: Passed out (i.e., lost consciousness, collapsed and was not responding)   Negative: Sounds like a life-threatening emergency to the triager   Negative: [1] Vaginal bleeding AND [2] pregnant 20 or more weeks   Negative: SEVERE abdominal pain    Protocols used: Pregnancy - Vaginal Bleeding Less Than 20 Weeks [facility]

## 2023-09-11 ENCOUNTER — NURSE TRIAGE (OUTPATIENT)
Dept: FAMILY MEDICINE | Facility: OTHER | Age: 28
End: 2023-09-11
Payer: COMMERCIAL

## 2023-09-11 NOTE — TELEPHONE ENCOUNTER
Patient reports going to [facility] ED on Saturday as she thought she may be pregnant.  It was confirmed that she is not pregnant and she just has her period.    Patient states she has been having nausea for the last 3 months and would like to figure out what is causing this.    Patient has an appointment with Dr. Jain on 09/19/23 to further discuss this.      PROTOCOL: See in Office Within 2 Weeks    Reason for Disposition   Nausea is a chronic symptom (recurrent or ongoing AND present > 4 weeks)    Additional Information   Negative: Receiving cancer chemotherapy medication   Negative: Taking prescription medication that could cause nausea (e.g., narcotics/opiates, antibiotics, OCPs, many others)   Negative: Nausea lasts > 1 week   Negative: Substance use (drug use) or unhealthy alcohol use, known or suspected   Negative: Fever > 104 F  (40 C)   Negative: Fever > 101 F  (38.3 C) and over 60 years of age   Negative: Fever > 100.0 F  (37.8 C) and bedridden (e.g., CVA, chronic illness, recovering from surgery)   Negative: Fever > 100.0 F  (37.8 C) and diabetes mellitus or weak immune system (e.g., HIV positive, cancer chemo, splenectomy, chronic steroids)   Negative: Taking any of the following medications: digoxin (Lanoxin), lithium, theophylline, phenytoin (Dilantin)   Negative: Patient wants to be seen   Negative: Yellowish color of the skin or white of the eye (i.e., jaundice)   Negative: Fever present > 3 days (72 hours)   Negative: Insulin-dependent diabetes (Type I) and glucose > 400 mg/dL (22 mmol/L)   Negative: Drinking very little and dehydration suspected (e.g., no urine > 12 hours, very dry mouth, very lightheaded)   Negative: Patient sounds very sick or weak to the triager   Negative: Unable to walk, or can only walk with assistance (e.g., requires support)   Negative: Difficulty breathing   Negative: Nausea or vomiting and pregnancy < 20 weeks   Negative: Menstrual Period - Missed or Late (i.e.,  pregnancy suspected)   Negative: Heat exhaustion suspected (i.e., dehydration from heat exposure)   Negative: Anxiety or stress suspected (i.e., nausea with anxiety attacks or stressful situations)   Negative: Traumatic Brain Injury (TBI) suspected   Negative: Vomiting occurs   Negative: Other symptom is present, see that guideline.  (e.g., chest pain, headache, dizziness, abdominal pain, colds, sore throat, etc.).   Negative: Shock suspected (e.g., cold/pale/clammy skin, too weak to stand, low BP, rapid pulse)   Negative: Sounds like a life-threatening emergency to the triager    Protocols used: Nausea-A-OH

## 2023-09-11 NOTE — TELEPHONE ENCOUNTER
Patient no longer needs hgb checked as she was in the ED at [facility] in [place] over the weekend.    ED had her do a UA, which was not pregnant, she was just having her period.

## 2023-09-18 ENCOUNTER — VIRTUAL VISIT (OUTPATIENT)
Dept: BEHAVIORAL HEALTH | Facility: CLINIC | Age: 28
End: 2023-09-18
Payer: COMMERCIAL

## 2023-09-18 DIAGNOSIS — F43.10 POSTTRAUMATIC STRESS DISORDER: Primary | ICD-10-CM

## 2023-09-18 PROCEDURE — 90791 PSYCH DIAGNOSTIC EVALUATION: CPT | Mod: TEL

## 2023-09-19 ENCOUNTER — VIRTUAL VISIT (OUTPATIENT)
Dept: FAMILY MEDICINE | Facility: OTHER | Age: 28
End: 2023-09-19
Payer: COMMERCIAL

## 2023-09-19 DIAGNOSIS — J30.2 SEASONAL ALLERGIC RHINITIS, UNSPECIFIED TRIGGER: ICD-10-CM

## 2023-09-19 DIAGNOSIS — R11.0 NAUSEA: ICD-10-CM

## 2023-09-19 DIAGNOSIS — R41.9 COGNITIVE COMPLAINTS: Primary | ICD-10-CM

## 2023-09-19 DIAGNOSIS — R19.7 DIARRHEA, UNSPECIFIED TYPE: ICD-10-CM

## 2023-09-19 DIAGNOSIS — K21.9 GASTROESOPHAGEAL REFLUX DISEASE WITHOUT ESOPHAGITIS: ICD-10-CM

## 2023-09-19 PROCEDURE — 99214 OFFICE O/P EST MOD 30 MIN: CPT | Mod: VID | Performed by: PHYSICIAN ASSISTANT

## 2023-09-19 RX ORDER — FLUTICASONE PROPIONATE 50 MCG
1 SPRAY, SUSPENSION (ML) NASAL DAILY
Qty: 15.8 ML | Refills: 3 | Status: SHIPPED | OUTPATIENT
Start: 2023-09-19

## 2023-09-19 NOTE — PROGRESS NOTES
Addendum: Consulted with colleague on where to start for FAS evaluation in adulthood. Will have patient see neuropsych, but may be redirected if the specialist feels their services are not appropriate.   Rolando Jain PA-C on 9/21/2023 at 8:34 AM      Kayy is a 28 year old who is being evaluated via a billable video visit.      How would you like to obtain your AVS? MyChart  If the video visit is dropped, the invitation should be resent by: Text to cell phone: [phone]  Will anyone else be joining your video visit? No      Assessment & Plan     Cognitive complaints  Patient is concerned about FAS testing. She informs me that she has struggled with academic difficulties, specifically math, throughout life. She recently has been written up at work due to counting errors. Recalls that parents were asked about FAS during her youth and did not pursue any testing. She does not know history of mother or pregnancy. I discussed with patient that I would need to look into options for testing, psychiatry vs alternative specialist.     Gastroesophageal reflux disease without esophagitis  Diarrhea, unspecified type  Nausea  Patient informs me that she has been constantly nauseated over the past 2-3 months. She initially attributed this to medication started by urologist and has since stopped this. Felt that the nausea did improve, but now has returned. In discussing diet I learned that the patient eats quite a bit of junk or quick prep foods. We discussed the importance of removing sodas, junk foods, fast foods, etc from her diet. I reviewed how these foods can cause nausea, GERD, diarrhea. Patient may require nutrition consult to learn more about how to make a healthy diet. I will have her complete a taper of PPI over the next 2-3 months while she is making the efforts to improve her diet.   - omeprazole (PRILOSEC) 20 MG DR capsule; Take 2 capsules (40 mg) by mouth daily for 30 days, THEN 1 capsule (20 mg) daily for  30 days, THEN 1 capsule (20 mg) every other day for 30 days.    Seasonal allergic rhinitis, unspecified trigger  Patient has been using flonase in the past for management of seasonal allergies. I will have her continue this without change.   - fluticasone (FLONASE) 50 MCG/ACT nasal spray; Spray 1 spray into both nostrils daily    JOCY [facility] ENEDELIA Sultana is a 28 year old, presenting for the following health issues:   Follow Up      Sometimes does not feel hungry in the evening  Will typically eat after   Has been skipping meals in the evenings    Patient completed E-check in.   Questionnaires blown in.   Patient checked in electronically without being called.      History of Present Illness       Reason for visit:  Health concerns/testing    She eats 0-1 servings of fruits and vegetables daily.She consumes 0 sweetened beverage(s) daily.She exercises with enough effort to increase her heart rate 20 to 29 minutes per day.  She exercises with enough effort to increase her heart rate 5 days per week.   She is not taking prescribed medications regularly due to remembering to take.       Review of Systems   Constitutional, HEENT, cardiovascular, pulmonary, gi and gu systems are negative, except as otherwise noted.      Objective           Vitals:  No vitals were obtained today due to virtual visit.    Physical Exam   GENERAL: Healthy, alert and no distress  EYES: Eyes grossly normal to inspection.  No discharge or erythema, or obvious scleral/conjunctival abnormalities.  RESP: No audible wheeze, cough, or visible cyanosis.  No visible retractions or increased work of breathing.    SKIN: Visible skin clear. No significant rash, abnormal pigmentation or lesions.  NEURO: Cranial nerves grossly intact.  Mentation and speech appropriate for age.  PSYCH: Mentation appears normal, affect normal/bright, judgement and insight intact, normal speech and appearance  well-groomed.            Video-Visit Details    Type of service:  Video Visit     Joined the call at 9/19/2023, 5:17:48 pm.  Left the call at 9/19/2023, 5:40:18 pm.  You were on the call for 22 minutes 30 seconds .    Originating Location (pt. Location): Home    Distant Location (provider location):  On-site  Platform used for Video Visit: Danyelle

## 2023-09-21 ENCOUNTER — MYC MEDICAL ADVICE (OUTPATIENT)
Dept: FAMILY MEDICINE | Facility: OTHER | Age: 28
End: 2023-09-21
Payer: COMMERCIAL

## 2023-09-26 ENCOUNTER — TELEPHONE (OUTPATIENT)
Dept: FAMILY MEDICINE | Facility: OTHER | Age: 28
End: 2023-09-26
Payer: COMMERCIAL

## 2023-09-26 ENCOUNTER — VIRTUAL VISIT (OUTPATIENT)
Dept: BEHAVIORAL HEALTH | Facility: CLINIC | Age: 28
End: 2023-09-26
Payer: COMMERCIAL

## 2023-09-26 DIAGNOSIS — F43.10 POSTTRAUMATIC STRESS DISORDER: Primary | ICD-10-CM

## 2023-09-26 PROCEDURE — 90834 PSYTX W PT 45 MINUTES: CPT | Mod: TEL

## 2023-09-26 NOTE — TELEPHONE ENCOUNTER
Kayy PUTNAM Post Alice Laureano Default Pool18 minutes ago (4:42 PM)     ZAID  Topic: Non-Medical Question.     From my understanding with my visit with my doctor, I was going to receive an at home sleep apnea kit. I haven't heard anything since my virtual visit with my doctor, and haven't received anything here at home. I'm wondering what is going on with that?        Virtual visit with sleep medicine 9/8/23.   Routing to team to follow up.     Erinn ARREDONDON, RN  [facility]
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
## 2023-10-06 ENCOUNTER — VIRTUAL VISIT (OUTPATIENT)
Dept: BEHAVIORAL HEALTH | Facility: CLINIC | Age: 28
End: 2023-10-06
Payer: COMMERCIAL

## 2023-10-06 DIAGNOSIS — F43.10 POSTTRAUMATIC STRESS DISORDER: Primary | ICD-10-CM

## 2023-10-06 PROCEDURE — 90832 PSYTX W PT 30 MINUTES: CPT | Mod: TEL
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
## 2023-10-09 ENCOUNTER — THERAPY VISIT (OUTPATIENT)
Dept: PHYSICAL THERAPY | Facility: CLINIC | Age: 28
End: 2023-10-09
Payer: COMMERCIAL

## 2023-10-09 DIAGNOSIS — R35.0 URINARY FREQUENCY: Primary | ICD-10-CM

## 2023-10-09 DIAGNOSIS — M62.89 PELVIC FLOOR DYSFUNCTION: ICD-10-CM

## 2023-10-09 DIAGNOSIS — N94.10 DYSPAREUNIA IN FEMALE: ICD-10-CM

## 2023-10-09 PROCEDURE — 97140 MANUAL THERAPY 1/> REGIONS: CPT | Mod: GP | Performed by: PHYSICAL THERAPIST

## 2023-10-10 NOTE — PROGRESS NOTES
PLAN  Continue therapy per current plan of care.    Beginning/End Dates of Progress Note Reporting Period:  08/02/23 to 10/09/2023    Referring Provider:  Tucker Castellano       10/09/23 0500   Appointment Info   Signing clinician's name / credentials Amanda Hilligoss, DPT   Total/Authorized Visits 8 (E&T)   Visits Used 5   Medical Diagnosis Urinary frequency   PT Tx Diagnosis Pelvic floor dysfunction w/ urinary frequency, urgency, dyspareunia, and constipation   Other pertinent information pt has endometriosis   Progress Note/Certification   Onset of illness/injury or Date of Surgery 07/05/23  (date of referral)   Therapy Frequency 2-3x/month   Predicted Duration x3 months (8 visits total)   Progress Note Due Date 09/30/23   Progress Note Completed Date 08/02/23   GOALS   PT Goals 2   PT Goal 1   Goal Identifier Freqency   Goal Description Pt will be able to wait an average of 2 hours between day time voids to establish regular voiding habbits and decrease time away at work and to be able to complete home tasks   Goal Progress voiding every 30-60 minutes on average when at home (rarely every 15 min, can now sometimes go 2 hours), voiding about every 30-60 min again work   Target Date 10/25/23   PT Goal 2   Goal Identifier Nocturia   Goal Description Pt will void 1x/night or less to establish restorative sleep pattern   Goal Progress 1x/night or less   Target Date 10/25/23   Date Met 08/17/23   Subjective Report   Subjective Report Pt has not been to therapy since 9/7/23 due to busy schedule/ family emergency. Continues to void every 30-60 minutes on average when at home and now again at work. Doesn't feel she is having the periods of time where she voids every 5-10 minutes any longer (15 is usually the most frequent). Has not been able to be as consistent w/ exercises and feels this is why frequency has increased again.   Objective Measures   Objective Measures Objective Measure 1;Objective Measure 2;Objective  Measure 3;Objective Measure 4;Objective Measure 5   Objective Measure 1   Objective Measure Lower abdominal MMT   Details 3/5 (able to hold legs in air, unable to lower w/o abdominal tenting or decrease in pelvic tilt)   Objective Measure 2   Objective Measure SI Screen   Details (-) Gillet test, (-) BRYANNA , (-) Active SLR   Objective Measure 3   Objective Measure Flexibility   Details (+) Derick test B, B TFL and ITB   Objective Measure 4   Objective Measure Palpation   Details Hypomobile lower abdominal fascia   Objective Measure 5   Objective Measure Pelvic Floor MMT   Details Pt declines internal pelvic floor assessment   Treatment Interventions (PT)   Interventions Neuromuscular Re-education;Manual Therapy   Neuromuscular Re-education   Neuromuscular re-ed of mvmt, balance, coord, kinesthetic sense, posture, proprioception minutes (48571) 3   Neuromuscular Re-education Neuro Re-ed 2;Neuro Re-ed 3;Neuro Re-ed 4;Neuro Re-ed 5   Neuro Re-ed 1 TA activation   Neuro Re-ed 1 - Details continue for HEP   Neuro Re-ed 2 Diaphragm breathing   Neuro Re-ed 2 - Details reviewed form with this   Manual Therapy   Manual Therapy: Mobilization, MFR, MLD, friction massage minutes (32239) 14   Manual Therapy Manual Therapy 2   Manual Therapy 1 Bowel massage   Manual Therapy 1 - Details x4 min   Manual Therapy 2 Visceral mobilization   Manual Therapy 2 - Details lower abdominal tissue stacking x 10 min   Skilled Intervention adjustment of technique, intensity, and location based on patient tolerance and tissue response   Patient Response/Progress well tolerated   Education   Learner/Method Patient;Listening;Reading;Demonstration;Pictures/Video;No Barriers to Learning   Plan   Home program focus on diaphragm breathing and contract relax for awareness of pelvic floor and urge supression, and added pelvic girdle stretches   Plan for next session continue lower abdominal massage and progress TA activation   Comments   Comments pt  arrived 25 min late due to going to wrong clinic   Total Session Time   Timed Code Treatment Minutes 17   Total Treatment Time (sum of timed and untimed services) 17

## 2023-10-16 ENCOUNTER — VIRTUAL VISIT (OUTPATIENT)
Dept: BEHAVIORAL HEALTH | Facility: CLINIC | Age: 28
End: 2023-10-16
Payer: COMMERCIAL

## 2023-10-16 DIAGNOSIS — F43.10 POSTTRAUMATIC STRESS DISORDER: Primary | ICD-10-CM

## 2023-10-16 PROCEDURE — 90832 PSYTX W PT 30 MINUTES: CPT | Mod: TEL

## 2023-10-19 ENCOUNTER — THERAPY VISIT (OUTPATIENT)
Dept: PHYSICAL THERAPY | Facility: CLINIC | Age: 28
End: 2023-10-19
Payer: COMMERCIAL

## 2023-10-19 DIAGNOSIS — N94.10 DYSPAREUNIA IN FEMALE: ICD-10-CM

## 2023-10-19 DIAGNOSIS — R35.0 URINARY FREQUENCY: Primary | ICD-10-CM

## 2023-10-19 DIAGNOSIS — M62.89 PELVIC FLOOR DYSFUNCTION: ICD-10-CM

## 2023-10-19 PROCEDURE — 97140 MANUAL THERAPY 1/> REGIONS: CPT | Mod: GP | Performed by: PHYSICAL THERAPIST

## 2023-10-19 PROCEDURE — 97110 THERAPEUTIC EXERCISES: CPT | Mod: GP | Performed by: PHYSICAL THERAPIST

## 2023-10-23 ENCOUNTER — THERAPY VISIT (OUTPATIENT)
Dept: PHYSICAL THERAPY | Facility: CLINIC | Age: 28
End: 2023-10-23
Payer: COMMERCIAL

## 2023-10-23 DIAGNOSIS — N94.10 DYSPAREUNIA IN FEMALE: ICD-10-CM

## 2023-10-23 DIAGNOSIS — R35.0 URINARY FREQUENCY: Primary | ICD-10-CM

## 2023-10-23 DIAGNOSIS — M62.89 PELVIC FLOOR DYSFUNCTION: ICD-10-CM

## 2023-10-23 PROCEDURE — 97112 NEUROMUSCULAR REEDUCATION: CPT | Mod: GP | Performed by: PHYSICAL THERAPIST

## 2023-10-23 PROCEDURE — 97110 THERAPEUTIC EXERCISES: CPT | Mod: GP | Performed by: PHYSICAL THERAPIST

## 2023-10-23 PROCEDURE — 97140 MANUAL THERAPY 1/> REGIONS: CPT | Mod: GP | Performed by: PHYSICAL THERAPIST

## 2023-10-24 ENCOUNTER — MYC MEDICAL ADVICE (OUTPATIENT)
Dept: FAMILY MEDICINE | Facility: OTHER | Age: 28
End: 2023-10-24
Payer: COMMERCIAL

## 2023-10-25 ENCOUNTER — VIRTUAL VISIT (OUTPATIENT)
Dept: BEHAVIORAL HEALTH | Facility: CLINIC | Age: 28
End: 2023-10-25
Payer: COMMERCIAL

## 2023-10-25 DIAGNOSIS — F43.10 POSTTRAUMATIC STRESS DISORDER: Primary | ICD-10-CM

## 2023-10-25 PROCEDURE — 90834 PSYTX W PT 45 MINUTES: CPT | Mod: TEL
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
## 2023-11-07 ENCOUNTER — VIRTUAL VISIT (OUTPATIENT)
Dept: BEHAVIORAL HEALTH | Facility: CLINIC | Age: 28
End: 2023-11-07
Payer: COMMERCIAL

## 2023-11-07 DIAGNOSIS — F43.10 POSTTRAUMATIC STRESS DISORDER: Primary | ICD-10-CM

## 2023-11-07 PROCEDURE — 90832 PSYTX W PT 30 MINUTES: CPT | Mod: TEL
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
## 2023-11-13 ENCOUNTER — THERAPY VISIT (OUTPATIENT)
Dept: PHYSICAL THERAPY | Facility: CLINIC | Age: 28
End: 2023-11-13
Payer: COMMERCIAL

## 2023-11-13 DIAGNOSIS — M62.89 PELVIC FLOOR DYSFUNCTION: ICD-10-CM

## 2023-11-13 DIAGNOSIS — N94.10 DYSPAREUNIA IN FEMALE: ICD-10-CM

## 2023-11-13 DIAGNOSIS — R35.0 URINARY FREQUENCY: Primary | ICD-10-CM

## 2023-11-13 PROCEDURE — 97110 THERAPEUTIC EXERCISES: CPT | Mod: GP

## 2023-11-13 PROCEDURE — 97112 NEUROMUSCULAR REEDUCATION: CPT | Mod: GP

## 2023-11-13 PROCEDURE — 97140 MANUAL THERAPY 1/> REGIONS: CPT | Mod: GP

## 2023-11-13 NOTE — PROGRESS NOTES
DISCHARGE  Reason for Discharge: Patient chooses to discontinue therapy.    Equipment Issued: None    Discharge Plan: Patient to continue home program.    Referring Provider:  Tucker Castellano     11/13/23 0500   Appointment Info   Signing clinician's name / credentials Rosita Levy, SPT; Amanda Hilligoss, DPT   Total/Authorized Visits 8 (E&T)   Visits Used 8   Medical Diagnosis Urinary frequency   PT Tx Diagnosis Pelvic floor dysfunction w/ urinary frequency, urgency, dyspareunia, and constipation   Other pertinent information pt has endometriosis   Quick Adds Student Supervision   Progress Note/Certification   Onset of illness/injury or Date of Surgery 07/05/23  (date of referral)   Therapy Frequency 2-3x/month   Predicted Duration x3 months (8 visits total)   Progress Note Due Date 12/08/23   Progress Note Completed Date 10/09/23   Supervision   Student Supervision Direct supervision provided;Direct Patient Contact Provided   GOALS   PT Goals 2   PT Goal 1   Goal Identifier Freqency   Goal Description Pt will be able to wait an average of 2 hours between day time voids to establish regular voiding habbits and decrease time away at work and to be able to complete home tasks   Goal Progress Pt is going 1.5 hours between voids 1-2x per day.   Target Date 11/16/23   PT Goal 2   Goal Identifier Nocturia   Goal Description Pt will void 1x/night or less to establish restorative sleep pattern   Goal Progress 1x/night or less   Target Date 10/25/23   Date Met 08/17/23   Subjective Report   Subjective Report Pt states that she is getting better slowly. Pt has been increasing her water intake and has times where she wont urinate for 1.5 hours 1-2x per day. Pt feels that quick squeezes are helping and can decrease her urge just a little bit. Pt is comfortable with her home program and would like to trial independent management of sx w/ HEP.   Objective Measures   Objective Measures Objective Measure 1;Objective Measure  2;Objective Measure 3;Objective Measure 4;Objective Measure 5   Objective Measure 1   Objective Measure Lower abdominal MMT   Details 42 degrees, 4/5   Objective Measure 3   Objective Measure Flexibility   Details (+) Derick R>L, B adductors, ITB, hamstrings, piriformis   Objective Measure 4   Objective Measure Palpation   Details Hypomobile lower abdominal fascia   Objective Measure 5   Objective Measure Pelvic Floor MMT   Details Pt declines internal pelvic floor assessment   Treatment Interventions (PT)   Interventions Neuromuscular Re-education;Manual Therapy   Therapeutic Procedure/Exercise   Therapeutic Procedures: strength, endurance, ROM, flexibillity minutes (44433) 10   Ther Proc 1 Adductor Stretch   Ther Proc 1 - Details Butterfly Stretch x1 min x2   Ther Proc 2 Hamstring Stretch   Ther Proc 2 - Details Manual with therapist overpressure x30s B   Ther Proc 3 Bridges   Ther Proc 3 - Details x 10 w/ 2-3 quick flicks during ea. rep x 2 sets (1 set w/ roll ins, 1 set w/ roll outs)   Skilled Intervention Adjusting HEP for ease and independence with pt discharge   Patient Response/Progress Pt has difficulty with roll out bridge and PF contraction   Neuromuscular Re-education   Neuromuscular re-ed of mvmt, balance, coord, kinesthetic sense, posture, proprioception minutes (53959) 11   Neuromuscular Re-education Neuro Re-ed 2;Neuro Re-ed 3;Neuro Re-ed 4;Neuro Re-ed 5   Neuro Re-ed 1 PF Elevators   Neuro Re-ed 1 - Details 2 Levels, x5 through, 3 Levels x5 through   Neuro Re-ed 2 Diaphragmatic Breathing   Neuro Re-ed 2 - Details Manual cueing for rib expansion   Neuro Re-ed 3 90/90 Marching   Neuro Re-ed 3 - Details x20, maintaining posterior pelvic tilt   Skilled Intervention Manual, verbal, and visual cuing with dosing recommendations   Patient Response/Progress Pt felt that 2 levels of elevator was easy, but struggled with 3   Manual Therapy   Manual Therapy: Mobilization, MFR, MLD, friction massage minutes  (69901) 21   Manual Therapy Manual Therapy 2   Manual Therapy 1 Adductor STM   Manual Therapy 1 - Details In butterfly position, cross friction to B adductors x6 min   Manual Therapy 2 Visceral mobilization   Manual Therapy 2 - Details Lower abdominal tissues stacking x 15min   Skilled Intervention adjustment of technique, intensity, and location based on patient tolerance and tissue response   Patient Response/Progress Pt tolerated well with minimal discomfort today   Education   Learner/Method Patient;Listening;Reading;Demonstration;Pictures/Video;No Barriers to Learning   Plan   Home program focus on diaphragm breathing and contract relax for awareness of pelvic floor and urge supression, and added pelvic girdle stretches   Plan for next session D/C at this time, continue HEP as planned   Total Session Time   Timed Code Treatment Minutes 42   Total Treatment Time (sum of timed and untimed services) 42

## 2023-11-17 ENCOUNTER — VIRTUAL VISIT (OUTPATIENT)
Dept: SLEEP MEDICINE | Facility: CLINIC | Age: 28
End: 2023-11-17
Payer: COMMERCIAL

## 2023-11-17 DIAGNOSIS — R06.83 SNORING: ICD-10-CM

## 2023-11-17 DIAGNOSIS — R11.0 NAUSEA: ICD-10-CM

## 2023-11-17 DIAGNOSIS — K21.9 GASTROESOPHAGEAL REFLUX DISEASE WITHOUT ESOPHAGITIS: Primary | ICD-10-CM

## 2023-11-17 DIAGNOSIS — R19.7 DIARRHEA, UNSPECIFIED TYPE: ICD-10-CM

## 2023-11-17 DIAGNOSIS — R40.0 DAYTIME SLEEPINESS: ICD-10-CM

## 2023-11-17 DIAGNOSIS — R11.2 NAUSEA AND VOMITING, UNSPECIFIED VOMITING TYPE: ICD-10-CM

## 2023-11-17 ASSESSMENT — SLEEP AND FATIGUE QUESTIONNAIRES
HOW LIKELY ARE YOU TO NOD OFF OR FALL ASLEEP WHILE SITTING AND READING: HIGH CHANCE OF DOZING
HOW LIKELY ARE YOU TO NOD OFF OR FALL ASLEEP WHEN YOU ARE A PASSENGER IN A CAR FOR AN HOUR WITHOUT A BREAK: SLIGHT CHANCE OF DOZING
HOW LIKELY ARE YOU TO NOD OFF OR FALL ASLEEP WHILE WATCHING TV: MODERATE CHANCE OF DOZING
HOW LIKELY ARE YOU TO NOD OFF OR FALL ASLEEP WHILE SITTING AND TALKING TO SOMEONE: SLIGHT CHANCE OF DOZING
HOW LIKELY ARE YOU TO NOD OFF OR FALL ASLEEP WHILE SITTING QUIETLY AFTER LUNCH WITHOUT ALCOHOL: HIGH CHANCE OF DOZING
HOW LIKELY ARE YOU TO NOD OFF OR FALL ASLEEP IN A CAR, WHILE STOPPED FOR A FEW MINUTES IN TRAFFIC: SLIGHT CHANCE OF DOZING
HOW LIKELY ARE YOU TO NOD OFF OR FALL ASLEEP WHILE LYING DOWN TO REST IN THE AFTERNOON WHEN CIRCUMSTANCES PERMIT: HIGH CHANCE OF DOZING
HOW LIKELY ARE YOU TO NOD OFF OR FALL ASLEEP WHILE SITTING INACTIVE IN A PUBLIC PLACE: MODERATE CHANCE OF DOZING

## 2023-11-28 NOTE — PROGRESS NOTES
Device has been registered and shipped via [facility] on 11/17/23. Patient was notified that package was mailed out.

## 2023-12-05 ENCOUNTER — VIRTUAL VISIT (OUTPATIENT)
Dept: BEHAVIORAL HEALTH | Facility: CLINIC | Age: 28
End: 2023-12-05
Payer: COMMERCIAL

## 2023-12-05 DIAGNOSIS — F43.10 POSTTRAUMATIC STRESS DISORDER: Primary | ICD-10-CM

## 2023-12-05 PROCEDURE — 90834 PSYTX W PT 45 MINUTES: CPT | Mod: TEL

## 2023-12-05 ASSESSMENT — PATIENT HEALTH QUESTIONNAIRE - PHQ9
10. IF YOU CHECKED OFF ANY PROBLEMS, HOW DIFFICULT HAVE THESE PROBLEMS MADE IT FOR YOU TO DO YOUR WORK, TAKE CARE OF THINGS AT HOME, OR GET ALONG WITH OTHER PEOPLE: SOMEWHAT DIFFICULT
SUM OF ALL RESPONSES TO PHQ QUESTIONS 1-9: 9
SUM OF ALL RESPONSES TO PHQ QUESTIONS 1-9: 9
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
## 2023-12-18 NOTE — PROGRESS NOTES
Was told device failed due to only having 13 min of sleep recorded. Called patient on 12/18/2023 at 11:36 AM and left message to call back and see if she wants to do another study. If so I will send one out on Friday the 22nd.    Danay Ibrahim CMA, HST Specialist  [place] [facility]

## 2024-01-05 ENCOUNTER — OFFICE VISIT (OUTPATIENT)
Dept: FAMILY MEDICINE | Facility: OTHER | Age: 29
End: 2024-01-05
Payer: COMMERCIAL

## 2024-01-05 VITALS
TEMPERATURE: 98.2 F | SYSTOLIC BLOOD PRESSURE: 122 MMHG | RESPIRATION RATE: 20 BRPM | OXYGEN SATURATION: 98 % | HEIGHT: 63 IN | HEART RATE: 90 BPM | WEIGHT: 200.5 LBS | BODY MASS INDEX: 35.53 KG/M2 | DIASTOLIC BLOOD PRESSURE: 68 MMHG

## 2024-01-05 DIAGNOSIS — J40 BRONCHITIS: Primary | ICD-10-CM

## 2024-01-05 PROCEDURE — 99214 OFFICE O/P EST MOD 30 MIN: CPT | Performed by: PHYSICIAN ASSISTANT

## 2024-01-05 RX ORDER — AZITHROMYCIN 250 MG/1
TABLET, FILM COATED ORAL
Qty: 6 TABLET | Refills: 0 | Status: SHIPPED | OUTPATIENT
Start: 2024-01-05 | End: 2024-01-10

## 2024-01-05 RX ORDER — BENZONATATE 100 MG/1
100 CAPSULE ORAL 3 TIMES DAILY PRN
Qty: 30 CAPSULE | Refills: 0 | Status: SHIPPED | OUTPATIENT
Start: 2024-01-05 | End: 2024-03-11

## 2024-01-05 ASSESSMENT — ENCOUNTER SYMPTOMS
COUGH: 1
SORE THROAT: 1

## 2024-01-05 NOTE — LETTER
January 5, 2024      Kayy N Post [address]   [facility] [address]        To Whom It May Concern,       Kayy was seen in clinic today, January 5, 2024. Please excuse her absence. She has been started on treatment for suspected respiratory infection. Return to work on 01/09/24.      Sincerely,        Rolando Jain PA-C

## 2024-01-05 NOTE — PROGRESS NOTES
Assessment & Plan     Bronchitis  Patient has been experiencing URI symptoms for ~2weeks. She say that her significant other had been treated for sinus infection this past month and that she was around him while he was ill. The patient has completed home covid test, this was negative. She was seen at the [facility] urgent care this past week and was treated with 2-3 days of liquid oral steroid. The UC also swabbed for strep which she reports returned negative. Feels that her cough is worsening and that she is feeling more congested. Discussed with patient that her timeframe is concerning for sinus infection vs bronchitis. Given the length of time that this has been present I opted to treat with azithromycin. I reviewed possible adverse effects of the medication with the patient and attached reference material to the AVS. She will reach out if not improving as expected.   - azithromycin (ZITHROMAX) 250 MG tablet; Take 2 tablets (500 mg) by mouth daily for 1 day, THEN 1 tablet (250 mg) daily for 4 days.  - benzonatate (TESSALON) 100 MG capsule; Take 1 capsule (100 mg) by mouth 3 times daily as needed for cough    JOCY Morejon [facility] ENEDELIA Sultana is a 28 year old, presenting for the following health issues:  Cough and Pharyngitis      1/5/2024    10:57 AM   Additional Questions   Roomed by Sherron VARNER   Accompanied by Jorge-boyfriend/       Cough  Associated symptoms include sore throat.   Pharyngitis   Associated symptoms include cough.   History of Present Illness       Reason for visit:  Sick  Symptom onset:  1-2 weeks ago  Symptoms include:  Cough sore throat headache  Symptom intensity:  Moderate  Symptom progression:  Staying the same  Had these symptoms before:  Yes  Has tried/received treatment for these symptoms:  No    She eats 0-1 servings of fruits and vegetables daily.She consumes 1 sweetened beverage(s) daily.She exercises with enough effort to  "increase her heart rate 9 or less minutes per day.  She exercises with enough effort to increase her heart rate 3 or less days per week. She is missing 2 dose(s) of medications per week.  She is not taking prescribed medications regularly due to remembering to take.     Was tested for COVID, strep, Flu at [facility] which were negative a week ago. She thinks it was all of those swabs. Was given a steroid because throat was swollen, has completed that now, it did help the swelling but still swollen just not as bad.    Review of Systems   HENT:  Positive for sore throat.    Respiratory:  Positive for cough.         Objective    /68   Pulse 90   Temp 98.2  F (36.8  C) (Temporal)   Resp 20   Ht 1.6 m (5' 3\")   Wt 90.9 kg (200 lb 8 oz)   LMP 12/07/2023   SpO2 98%   BMI 35.52 kg/m    Body mass index is 35.52 kg/m .  Physical Exam   GENERAL: healthy, alert and no distress  EYES: Eyes grossly normal to inspection, PERRL and conjunctivae and sclerae normal  HENT: normal cephalic/atraumatic, ear canals and TM's normal, nasal mucosa edematous , rhinorrhea clear, oral mucous membranes moist, and tonsillar erythema  NECK: no adenopathy, no asymmetry, masses, or scars and thyroid normal to palpation  RESP: lungs clear to auscultation - no rales, rhonchi or wheezes  CV: regular rate and rhythm, normal S1 S2, no S3 or S4, no murmur, click or rub, no peripheral edema and peripheral pulses strong  MS: no gross musculoskeletal defects noted, no edema  PSYCH: mentation appears normal, affect normal/bright                  "

## 2024-01-30 ENCOUNTER — VIRTUAL VISIT (OUTPATIENT)
Dept: BEHAVIORAL HEALTH | Facility: CLINIC | Age: 29
End: 2024-01-30
Payer: COMMERCIAL

## 2024-01-30 DIAGNOSIS — F43.10 POSTTRAUMATIC STRESS DISORDER: Primary | ICD-10-CM

## 2024-01-30 PROCEDURE — 99207 PR NO BILLABLE SERVICE THIS VISIT: CPT | Mod: 93

## 2024-01-30 ASSESSMENT — PATIENT HEALTH QUESTIONNAIRE - PHQ9
10. IF YOU CHECKED OFF ANY PROBLEMS, HOW DIFFICULT HAVE THESE PROBLEMS MADE IT FOR YOU TO DO YOUR WORK, TAKE CARE OF THINGS AT HOME, OR GET ALONG WITH OTHER PEOPLE: NOT DIFFICULT AT ALL
SUM OF ALL RESPONSES TO PHQ QUESTIONS 1-9: 8
SUM OF ALL RESPONSES TO PHQ QUESTIONS 1-9: 8

## 2024-01-30 NOTE — PROGRESS NOTES
"Non-billable visit: <16 minutes    Checked in about ongoing care plans and reviewed PHQ responses. Patient stated, \"I've been so stressed it's been hard to function, affecting sleep, that's where those thoughts came in.\" No SI for a few days, denied any safety concerns. Reported ongoing distress related to family dynamics as a contributing factor to MH symptoms in addition to recent interpersonal stressors related to poor boundaries.     Patient has intake appointment scheduled with a therapist later this week. This therapist is trained in DBT and EMDR. Patient expressed hopefulness about this being a good fit. [facility] provided instructions for scheduling with another [facility] if this service is ever needed.         "

## 2024-03-04 ENCOUNTER — TELEPHONE (OUTPATIENT)
Dept: SLEEP MEDICINE | Facility: CLINIC | Age: 29
End: 2024-03-04

## 2024-03-11 ENCOUNTER — VIRTUAL VISIT (OUTPATIENT)
Dept: FAMILY MEDICINE | Facility: OTHER | Age: 29
End: 2024-03-11
Payer: COMMERCIAL

## 2024-03-11 DIAGNOSIS — G43.909 MIGRAINE WITHOUT STATUS MIGRAINOSUS, NOT INTRACTABLE, UNSPECIFIED MIGRAINE TYPE: ICD-10-CM

## 2024-03-11 DIAGNOSIS — R30.0 DYSURIA: ICD-10-CM

## 2024-03-11 DIAGNOSIS — N89.8 VAGINAL ITCHING: Primary | ICD-10-CM

## 2024-03-11 DIAGNOSIS — N80.9 ENDOMETRIOSIS: ICD-10-CM

## 2024-03-11 DIAGNOSIS — F41.9 ANXIETY: ICD-10-CM

## 2024-03-11 PROCEDURE — 99214 OFFICE O/P EST MOD 30 MIN: CPT | Mod: 95 | Performed by: PHYSICIAN ASSISTANT

## 2024-03-11 RX ORDER — VENLAFAXINE HYDROCHLORIDE 37.5 MG/1
37.5 CAPSULE, EXTENDED RELEASE ORAL DAILY
Qty: 30 CAPSULE | Refills: 1 | Status: SHIPPED | OUTPATIENT
Start: 2024-03-11 | End: 2024-04-11 | Stop reason: SINTOL

## 2024-03-11 RX ORDER — PHENAZOPYRIDINE HYDROCHLORIDE 200 MG/1
200 TABLET, FILM COATED ORAL 3 TIMES DAILY PRN
Qty: 6 TABLET | Refills: 0 | Status: SHIPPED | OUTPATIENT
Start: 2024-03-11 | End: 2024-03-13

## 2024-03-11 ASSESSMENT — PATIENT HEALTH QUESTIONNAIRE - PHQ9
SUM OF ALL RESPONSES TO PHQ QUESTIONS 1-9: 12
10. IF YOU CHECKED OFF ANY PROBLEMS, HOW DIFFICULT HAVE THESE PROBLEMS MADE IT FOR YOU TO DO YOUR WORK, TAKE CARE OF THINGS AT HOME, OR GET ALONG WITH OTHER PEOPLE: SOMEWHAT DIFFICULT
SUM OF ALL RESPONSES TO PHQ QUESTIONS 1-9: 12

## 2024-03-11 ASSESSMENT — ENCOUNTER SYMPTOMS: HEADACHES: 1

## 2024-03-11 NOTE — PROGRESS NOTES
"    Instructions Relayed to Patient by Virtual Roomer:     Patient is active on Spyra:   Relayed following to patient: \"It looks like you are active on Spyra, are you able to join the visit this way? If not, do you need us to send you a link now or would you like your provider to send a link via text or email when they are ready to initiate the visit?\"    Reminded patient to ensure they were logged on to virtual visit by arrival time listed. Documented in appointment notes if patient had flexibility to initiate visit sooner than arrival time. If pediatric virtual visit, ensured pediatric patient along with parent/guardian will be present for video visit.     Patient offered the website [email or web address] and/or phone number to Spyra Help line: [phone]    Kayy is a 28 year old who is being evaluated via a billable video visit.      How would you like to obtain your AVS? Mail a copy  If the video visit is dropped, the invitation should be resent by: Text to cell phone: [phone]  Will anyone else be joining your video visit? No    Assessment & Plan     Vaginal itching  Dysuria  History of BV in the past. Patient reports itching, irritation and pain with urination. She has also feels that there is a more noticeable odor different from her normal. She also informs me that she has a history of endometriosis diagnosed in the past through an external clinic. Per the patient no therapy was recommended at that time. Unclear as to whether this condition would be perpetuating her ongoing bladder issues. I will have her complete wet prep and UA to further evaluate her symptoms. Pyridium sent out for symptom management until she can complete the labs.   - Wet prep - lab collect; Future  - UA Macroscopic with reflex to Microscopic and Culture - Lab Collect; Future  - phenazopyridine (PYRIDIUM) 200 MG tablet; Take 1 tablet (200 mg) by mouth 3 times daily as needed for " "irritation    Anxiety  Migraine without status migrainosus, not intractable, unspecified migraine type  Patient stopped use of nurtec due to nausea. She said that she has been off of the medication for more than a month. In that time she has had an increase in her headaches. In review of her past tried therapies I see that she had used topamax and amitriptyline, but did not tolerate these medications. Patient reports that she has been experiencing increased anxiety recently as well. Discussed trial of effexor at low dose as this can benefit both anxiety and migraines. Reviewed possible adverse effects as well as timeframe for benefit. Patient was receptive to this plan and will follow up in 1-2 months for re-evaluation.   - venlafaxine (EFFEXOR XR) 37.5 MG 24 hr capsule; Take 1 capsule (37.5 mg) by mouth daily    Endometriosis  Reported diagnosis of endometriosis from external clinic. I do not appear to have records of this condition. She would like to consult with gynecologist about treatment options as she was told no treatments were recommended at the time of diagnosis. I am in support of this. I expect that the patient will require updated imaging to confirm diagnosis prior to pursuit of treatments.   - Ob/Gyn  Referral; Future    BMI  Estimated body mass index is 35.52 kg/m  as calculated from the following:    Height as of 1/5/24: 1.6 m (5' 3\").    Weight as of 1/5/24: 90.9 kg (200 lb 8 oz).   Weight management plan: Discussed healthy diet and exercise guidelines    Keisha Sultana is a 28 year old, presenting for the following health issues:  Headache and Vaginal Problem        3/11/2024     4:08 PM   Additional Questions   Roomed by albert   Accompanied by self     Headache     Vaginal Problem     History of Present Illness       Headaches:   Since the patient's last clinic visit, headaches are: no change  The patient is getting headaches:  Everyday, last one was 2-3 days long  She is able to do " "normal daily activities when she has a migraine.  The patient is taking the following rescue/relief medications:  No rescue/relief medications   Patient states \"The relief is inconsistent\" from the rescue/relief medications.   The patient is taking the following medications to prevent migraines:  Other  In the past 4 weeks, the patient has gone to an Urgent Care or Emergency Room 0 times times due to headaches.    Reason for visit:  Headahces, BV and bladder issues      Pt says she's been taking magnesium pills (ask for full name) and after that that's when she started feeling dizzy and lightheaded, hasn't noticed anything for headaches    Vaginal Symptoms  Onset/Duration: ongoing, been off and on for a couple months  Description:  Vaginal Discharge: none   Itching (Pruritis): YES - occassionally  Burning sensation:  YES - when urinating  Odor: YES  Accompanying Signs & Symptoms:  Urinary symptoms: YES - burning a little  Abdominal pain: YES- maybe due to period  Fever: No  History:   Sexually active: YES  New Partner: No  Possibility of Pregnancy:  YES  Recent antibiotic use: No  Previous vaginitis issues: YES  Precipitating or alleviating factors: pt says she uses generic feminine wash for cleaning once and awhile  Therapies tried and outcome: pt says she's tried stuff from online stores and didn't help        Review of Systems  Constitutional, HEENT, cardiovascular, pulmonary, gi and gu systems are negative, except as otherwise noted.      Objective           Vitals:  No vitals were obtained today due to virtual visit.    Physical Exam   GENERAL: alert and no distress  EYES: Eyes grossly normal to inspection.  No discharge or erythema, or obvious scleral/conjunctival abnormalities.  RESP: No audible wheeze, cough, or visible cyanosis.    SKIN: Visible skin clear. No significant rash, abnormal pigmentation or lesions.  NEURO: Cranial nerves grossly intact.  Mentation and speech appropriate for age.  PSYCH: " Appropriate affect, tone, and pace of words        Visit Details    Type of service:  Video Visit   Minutes: 20 minutes    Originating Location (pt. Location): Home    Distant Location (provider location):  Off-site  Platform used for Video Visit: Danyelle  Signed Electronically by: Rolando Jain PA-C

## 2024-03-14 ENCOUNTER — NURSE TRIAGE (OUTPATIENT)
Dept: FAMILY MEDICINE | Facility: OTHER | Age: 29
End: 2024-03-14
Payer: COMMERCIAL

## 2024-03-14 NOTE — TELEPHONE ENCOUNTER
Pt felt a quick pain lasting less than one min this morning and another one lasting less than 30 seconds yesterday. Pt is unsure of anything that brings it on.     Pt fells ok otherwise, states she finished her phenazopyridine (PYRIDIUM) 200 MG tablet yesterday and wonders if this may be the issue.     Pt plans to follow up with us if she continues to experience this pain,     Krystyna Arreola RN  Reason for Disposition   Chest pain(s) lasting a few seconds    Additional Information   Negative: Followed an injury to chest   Negative: SEVERE chest pain   Negative: Pain also in shoulder(s) or arm(s) or jaw   Negative: Difficulty breathing   Negative: Cocaine use within last 3 days   Negative: Major surgery in the past month   Negative: Hip or leg fracture (broken bone) in past month (or had cast on leg or ankle in past month)   Negative: Illness requiring prolonged bedrest in past month (e.g., immobilization, long hospital stay)   Negative: Long-distance travel in past month (e.g., car, bus, train, plane; with trip lasting 6 or more hours)   Negative: History of prior 'blood clot' in leg or lungs (i.e., deep vein thrombosis, pulmonary embolism)   Negative: History of inherited increased risk of blood clots (e.g., Factor 5 Leiden, Anti-thrombin 3, Protein C or Protein S deficiency, Prothrombin mutation)   Negative: Cancer treatment in the past two months (or has cancer now)   Negative: Heart beating irregularly or very rapidly   Negative: Chest pain lasting longer than 5 minutes and occurred in last 3 days (72 hours) (Exception: Feels exactly the same as previously diagnosed heartburn and has accompanying sour taste in mouth.)   Negative: Chest pain or 'angina' comes and goes and is happening more often (increasing in frequency) or getting worse (increasing in severity) (Exception: Chest pains that last only a few seconds.)   Negative: Dizziness or lightheadedness   Negative: Coughing up blood   Negative: Patient  sounds very sick or weak to the triager   Negative: Patient says chest pain feels exactly the same as previously diagnosed 'heartburn' and describes burning in chest and accompanying sour taste in mouth   Negative: Fever > 100.4 F (38.0 C)   Negative: Chest pain(s) lasting a few seconds persists > 3 days   Negative: Rash in same area as pain (may be described as 'small blisters')   Negative: All other patients with chest pain (Exception: Fleeting chest pain lasting a few seconds.)   Negative: Patient wants to be seen    Protocols used: Chest Pain-A-OH

## 2024-03-15 ENCOUNTER — LAB (OUTPATIENT)
Dept: LAB | Facility: OTHER | Age: 29
End: 2024-03-15
Payer: COMMERCIAL

## 2024-03-15 DIAGNOSIS — N76.0 BV (BACTERIAL VAGINOSIS): Primary | ICD-10-CM

## 2024-03-15 DIAGNOSIS — B96.89 BV (BACTERIAL VAGINOSIS): Primary | ICD-10-CM

## 2024-03-15 DIAGNOSIS — N89.8 VAGINAL ITCHING: ICD-10-CM

## 2024-03-15 DIAGNOSIS — R30.0 DYSURIA: ICD-10-CM

## 2024-03-15 LAB
ALBUMIN UR-MCNC: NEGATIVE MG/DL
APPEARANCE UR: CLEAR
BILIRUB UR QL STRIP: NEGATIVE
CLUE CELLS: PRESENT
COLOR UR AUTO: YELLOW
GLUCOSE UR STRIP-MCNC: NEGATIVE MG/DL
HGB UR QL STRIP: NEGATIVE
KETONES UR STRIP-MCNC: ABNORMAL MG/DL
LEUKOCYTE ESTERASE UR QL STRIP: NEGATIVE
NITRATE UR QL: NEGATIVE
PH UR STRIP: 6 [PH] (ref 5–7)
SP GR UR STRIP: >=1.03 (ref 1–1.03)
TRICHOMONAS, WET PREP: ABNORMAL
UROBILINOGEN UR STRIP-ACNC: 0.2 E.U./DL
WBC'S/HIGH POWER FIELD, WET PREP: ABNORMAL
YEAST, WET PREP: ABNORMAL

## 2024-03-15 PROCEDURE — 87210 SMEAR WET MOUNT SALINE/INK: CPT

## 2024-03-15 PROCEDURE — 81003 URINALYSIS AUTO W/O SCOPE: CPT

## 2024-03-15 RX ORDER — METRONIDAZOLE 500 MG/1
500 TABLET ORAL 2 TIMES DAILY
Qty: 14 TABLET | Refills: 0 | Status: SHIPPED | OUTPATIENT
Start: 2024-03-15 | End: 2024-03-22

## 2024-04-08 ENCOUNTER — MYC MEDICAL ADVICE (OUTPATIENT)
Dept: FAMILY MEDICINE | Facility: OTHER | Age: 29
End: 2024-04-08
Payer: COMMERCIAL

## 2024-04-08 NOTE — TELEPHONE ENCOUNTER
Please triage for possible mental health concern. Patient already considering ED for psych related concerns.     Rolando Jain PA-C on 4/8/2024 at 7:14 AM

## 2024-04-08 NOTE — TELEPHONE ENCOUNTER
RN Triage    Patient Contact    Attempt # 1    RN did attempt to reach patient . No answer. Message left for patient to call the clinic back and ask to speak to a triage nurse.     Calling to triage mental health. See [employer] message.    Alley Hernandez RN on 4/8/2024 at 12:12 PM

## 2024-04-08 NOTE — TELEPHONE ENCOUNTER
Patient called back.     Patient is grateful for the information provided. [facility] as she did not have a pen/paper at the time of the call.     Advised patient to seek emergency care if symptoms worsen per RN protocol. Patient verbalized understanding and no further questions at this time.    Molly Mcgrath RN on 4/8/2024 at 4:19 PM

## 2024-04-10 ENCOUNTER — NURSE TRIAGE (OUTPATIENT)
Dept: FAMILY MEDICINE | Facility: OTHER | Age: 29
End: 2024-04-10
Payer: COMMERCIAL

## 2024-04-10 DIAGNOSIS — F41.9 ANXIETY: Primary | ICD-10-CM

## 2024-04-10 NOTE — TELEPHONE ENCOUNTER
Okay to stop Effexor. JR should discuss other medication options when he is back in tomorrow.    Kelechi Mathews PA-C

## 2024-04-10 NOTE — TELEPHONE ENCOUNTER
"Nurse Triage SBAR    Is this a 2nd Level Triage? YES, LICENSED PRACTITIONER REVIEW IS REQUIRED    Situation: Medication side effects    Background: Patient states she is experiencing the following side effects; light headed, dizzy, nauseated, head pressure, sleepy during the day time, when sleeping - leg/arms \"jolt\", burning in Left eye, fast heart beat, heartburn/chest discomfort, migraines.     Assessment: Patient states she is at work now ok to leave a detailed voicemail on cell.     Protocol Recommended Disposition:   Callback by PCP Today please call patient.    Recommendation: Can she stop taking the Venlafaxine? Patient wants to know if there is another anxiety medication she can try?  Advised patient to seek emergency care if symptoms worsen per RN protocol.     Routed to provider    Molly Mcgrath RN on 4/10/2024 at 2:20 PM      Reason for Disposition   Caller has NON-URGENT medicine question about med that PCP or specialist prescribed and triager unable to answer question    Additional Information   Negative: Intentional drug overdose and suicidal thoughts or ideas   Negative: Drug overdose and triager unable to answer question   Negative: Caller requesting a renewal or refill of a medicine patient is currently taking   Negative: Caller requesting information unrelated to medicine   Negative: Caller requesting information about COVID-19 Vaccine   Negative: Caller requesting information about Emergency Contraception   Negative: Caller requesting information about Combined Birth Control Pills   Negative: Caller requesting information about Progestin Birth Control Pills   Negative: Caller requesting information about Post-Op pain or medicines   Negative: Caller requesting a prescription antibiotic (such as penicillin) for Strep throat and has a positive culture result   Negative: Caller requesting a prescription anti-viral med (such as Tamiflu) and has influenza (flu) symptoms   Negative: Immunization " "reaction suspected   Negative: Rash while taking a medicine or within 3 days of stopping it   Negative: Asthma and having symptoms of asthma (cough, wheezing, etc.)   Negative: Symptom of illness (e.g., headache, abdominal pain, earache, vomiting) that are more than mild   Negative: Breastfeeding questions about mother's medicines and diet   Negative: MORE THAN A DOUBLE DOSE of a prescription or over-the-counter (OTC) drug   Negative: DOUBLE DOSE (an extra dose or lesser amount) of prescription drug and any symptoms (e.g., dizziness, nausea, pain, sleepiness)   Negative: DOUBLE DOSE (an extra dose or lesser amount) of over-the-counter (OTC) drug and any symptoms (e.g., dizziness, nausea, pain, sleepiness)   Negative: Took another person's prescription drug   Negative: DOUBLE DOSE (an extra dose or lesser amount) of prescription drug and NO symptoms  (Exception: A double dose of antibiotics.)   Negative: Diabetes drug error or overdose (e.g., took wrong type of insulin or took extra dose)   Negative: Caller has medication question about med NOT prescribed by PCP and triager unable to answer question (e.g., compatibility with other med, storage)   Negative: Prescription not at pharmacy and was prescribed by PCP recently  (Exception: triager has access to EMR and prescription is recorded there. Go to Home Care and confirm for pharmacy.)   Negative: Pharmacy calling with prescription question and triager unable to answer question   Negative: Caller has URGENT medicine question about med that PCP or specialist prescribed and triager unable to answer question    Answer Assessment - Initial Assessment Questions  1. NAME of MEDICINE: \"What medicine(s) are you calling about?\"    Venlafaxine/Effexor  2. QUESTION: \"What is your question?\" (e.g., double dose of medicine, side effect)      Side Effects  3. PRESCRIBER: \"Who prescribed the medicine?\" Reason: if prescribed by specialist, call should be referred to that group.      " "Rolando Jain PA-C  4. SYMPTOMS: \"Do you have any symptoms?\" If Yes, ask: \"What symptoms are you having?\"  \"How bad are the symptoms (e.g., mild, moderate, severe)      moderate  5. PREGNANCY:  \"Is there any chance that you are pregnant?\" \"When was your last menstrual period?\"      NA    Protocols used: Medication Question Call-A-OH    "

## 2024-04-10 NOTE — TELEPHONE ENCOUNTER
Patient returned call. Writer read below advice from provider to patient.     Routing to PCP for review upon return, per covering provider note below.     MARIA ELENA LeaN, RN

## 2024-04-10 NOTE — TELEPHONE ENCOUNTER
RN Triage    Patient Contact    Attempt # 1    Was call answered?  No.  Left message on voicemail with information to call me back.    Krysta Payan RN on 4/10/2024 at 2:41 PM

## 2024-04-11 RX ORDER — FLUOXETINE 10 MG/1
10 CAPSULE ORAL DAILY
Qty: 30 CAPSULE | Refills: 1 | Status: SHIPPED | OUTPATIENT
Start: 2024-04-11 | End: 2024-08-16

## 2024-05-06 ENCOUNTER — OFFICE VISIT (OUTPATIENT)
Dept: OBGYN | Facility: OTHER | Age: 29
End: 2024-05-06
Attending: PHYSICIAN ASSISTANT
Payer: COMMERCIAL

## 2024-05-06 VITALS — DIASTOLIC BLOOD PRESSURE: 74 MMHG | WEIGHT: 198.2 LBS | BODY MASS INDEX: 35.11 KG/M2 | SYSTOLIC BLOOD PRESSURE: 123 MMHG

## 2024-05-06 DIAGNOSIS — Z72.51 UNPROTECTED SEXUAL INTERCOURSE: ICD-10-CM

## 2024-05-06 DIAGNOSIS — R10.84 ABDOMINAL PAIN, GENERALIZED: Primary | ICD-10-CM

## 2024-05-06 DIAGNOSIS — N80.9 ENDOMETRIOSIS: ICD-10-CM

## 2024-05-06 DIAGNOSIS — N83.201 RIGHT OVARIAN CYST: ICD-10-CM

## 2024-05-06 DIAGNOSIS — N94.10 DYSPAREUNIA IN FEMALE: ICD-10-CM

## 2024-05-06 LAB
CLUE CELLS: NORMAL
TRICHOMONAS, WET PREP: NORMAL
WBC'S/HIGH POWER FIELD, WET PREP: NORMAL
YEAST, WET PREP: NORMAL

## 2024-05-06 PROCEDURE — 87591 N.GONORRHOEAE DNA AMP PROB: CPT | Performed by: OBSTETRICS & GYNECOLOGY

## 2024-05-06 PROCEDURE — 87491 CHLMYD TRACH DNA AMP PROBE: CPT | Performed by: OBSTETRICS & GYNECOLOGY

## 2024-05-06 PROCEDURE — 99214 OFFICE O/P EST MOD 30 MIN: CPT | Performed by: OBSTETRICS & GYNECOLOGY

## 2024-05-06 PROCEDURE — 87210 SMEAR WET MOUNT SALINE/INK: CPT | Performed by: OBSTETRICS & GYNECOLOGY

## 2024-05-06 NOTE — PROGRESS NOTES
"Subjective  29 year old non-pregnant female presents today complaining of possible endometriosis.  Patient has generalized abdominal pain.  Lower and upper abdominal pain.  Patient was seen by Dr. Frey last August and was prescribed an oral contractive pills.  Patient had an ultrasound done last year which showed a simple right ovarian cyst.  She is concerned that this is the cause of her abdominal pain.  I recommended she repeat the ultrasound to see there was resolution of the cyst.  Patient has been on and off of birth control since she was 17.  Patient states she hasn't been taking the oral contractive pills because it makes her abdominal pain worse.  She says the pelvic pain is worse and her menses are longer on the pill.  Patient has done Depo in the past but stopped it because \"of a lot of weird symptoms\".  Patient has not been on an oral contractive pills for 6 years.  Her menses are regular, monthly usually.  She bleeds for 3-4 days.  When she takes a pill she says she bleeds for 2-3 months straight.  No problems urinating, however sometimes it takes a long time for her to go.  She does have overactive bladder.  Normal bowel movements.  Patient is sexually active.  Some dyspareunia-this has always been a things she says due to damage \"down there\" in the past.  Rare vaginal spotting after intercourse.  No pregnancies.  Patient has never seen GI.  Patient states every female in her family has endometriosis.      I personally reviewed the ultrasound and the findings showed a right ovarian cyst.    I also reviewed notes from previous office visits by Dr. Frey.    ROS: 10 point ROS neg other than the symptoms noted above in the HPI.  Past Medical History:   Diagnosis Date    Anxiety     Borderline personality disorder (H)     Major depression     PTSD (post-traumatic stress disorder)      Past Surgical History:   Procedure Laterality Date    wisdom teeth       Family History   Problem Relation Age of Onset    " Anxiety Disorder Mother     Depression Mother     Multiple Sclerosis Mother     Post-Traumatic Stress Disorder (PTSD) Father     Breast Cancer Paternal Grandmother      Social History     Tobacco Use    Smoking status: Former     Types: Cigarettes     Passive exposure: Never    Smokeless tobacco: Never    Tobacco comments:     Quit 2017. Was up to 3 ppd. Smoked about 6 years   Substance Use Topics    Alcohol use: Yes     Comment: once every 2-3 months         Objective  Vitals: /74 (BP Location: Left arm, Cuff Size: Adult Regular)   Wt 89.9 kg (198 lb 3.2 oz)   LMP 05/05/2024 (Exact Date)   BMI 35.11 kg/m    BMI= Body mass index is 35.11 kg/m .    General appearance=well developed, well-nourished female  Gait=normal  Psych=mood is stable, alert and oriented x3  Abd=soft, Nontender/nondistended, no masses, no signs of hernias, no evidence of hepatosplenomegaly  PELVIC:    External genitalia: normal without lesions or masses  Urethral meatus: no lesions or prolapse noted, normal size  Urethra: no masses, non tender  Bladder: non tender, no fullness  Vagina: normal mucosa and rugae, no discharge, wet prep obtained  Cervix: normal without lesion, no cervical motion tenderness, healthy, nulliparous, GC and Chlamydia obtained  Uterus: small, mobile, nontender.  Adnexa: non tender, without masses  Rectal: deffered  Ext=no clubbing or cyanosis, no swelling      Pelvic ultrasound=8/29/2023:  FINDINGS:     UTERUS: 7.5 x 4.1 x 3.2 cm. Normal in size and position with no  masses.     ENDOMETRIUM: 10 mm. Normal smooth endometrium.     RIGHT OVARY: 4 x 3.4 x 3.6 cm. A right ovarian simple cyst measures  3.3 cm. Otherwise unremarkable.     LEFT OVARY: 2.4 x 1.4 x 1.5 cm. Left ovary is seen transabdominally  only, and appears unremarkable where visualized.     No significant free fluid.                                                                      IMPRESSION:  1.  A right ovarian simple cyst measures 3.3 cm.  2.   Otherwise unremarkable pelvic ultrasound examination. No other  cause for pelvic pain is identified       Assessment  1.)  Generalized abdominal pain  2.)  Right ovarian cyst  3.)  Questionable endometriosis  4.)  Unprotected intercourse  5.)  Dyspareunia      Plan  1.)  Pelvic ultrasound ordered  2.)  Consider oral contractive pills   3.)  STD testing      Acute, uncomplicated illness or injury and interpretation of ultrasound findings ordered by a different provider.  We discussed her ultrasound results in detail.  I recommended she repeat the ultrasound given her abdominal pain as well as a history of the right ovarian cyst to ensure resolution of the cyst.  We talked about endometriosis in detail as well as treatment options.  Patient is not really wanting to try an OCP at this time given that in the past they have made her menses longer and more painful.  We discussed other routes for the hormonal therapy.  Patient will think about her options.  Nursing notes read and reviewed    Mary Love DO

## 2024-05-07 LAB
C TRACH DNA SPEC QL NAA+PROBE: NEGATIVE
N GONORRHOEA DNA SPEC QL NAA+PROBE: NEGATIVE

## 2024-06-04 ENCOUNTER — OFFICE VISIT (OUTPATIENT)
Dept: FAMILY MEDICINE | Facility: OTHER | Age: 29
End: 2024-06-04
Payer: COMMERCIAL

## 2024-06-04 VITALS
RESPIRATION RATE: 16 BRPM | TEMPERATURE: 97.3 F | SYSTOLIC BLOOD PRESSURE: 122 MMHG | DIASTOLIC BLOOD PRESSURE: 68 MMHG | HEART RATE: 67 BPM | OXYGEN SATURATION: 99 % | WEIGHT: 190 LBS | BODY MASS INDEX: 33.66 KG/M2

## 2024-06-04 DIAGNOSIS — Z63.9 RELATIONSHIP PROBLEMS: Primary | ICD-10-CM

## 2024-06-04 DIAGNOSIS — H93.13 TINNITUS, BILATERAL: ICD-10-CM

## 2024-06-04 DIAGNOSIS — G43.909 MIGRAINE WITHOUT STATUS MIGRAINOSUS, NOT INTRACTABLE, UNSPECIFIED MIGRAINE TYPE: ICD-10-CM

## 2024-06-04 PROCEDURE — 99214 OFFICE O/P EST MOD 30 MIN: CPT | Performed by: PHYSICIAN ASSISTANT

## 2024-06-04 RX ORDER — PROPRANOLOL HYDROCHLORIDE 20 MG/1
TABLET ORAL
Qty: 45 TABLET | Refills: 0 | Status: SHIPPED | OUTPATIENT
Start: 2024-06-04 | End: 2024-08-16

## 2024-06-04 SDOH — SOCIAL STABILITY - SOCIAL INSECURITY: PROBLEM RELATED TO PRIMARY SUPPORT GROUP, UNSPECIFIED: Z63.9

## 2024-06-04 ASSESSMENT — PAIN SCALES - GENERAL: PAINLEVEL: NO PAIN (0)

## 2024-06-04 ASSESSMENT — PATIENT HEALTH QUESTIONNAIRE - PHQ9
SUM OF ALL RESPONSES TO PHQ QUESTIONS 1-9: 13
SUM OF ALL RESPONSES TO PHQ QUESTIONS 1-9: 13
10. IF YOU CHECKED OFF ANY PROBLEMS, HOW DIFFICULT HAVE THESE PROBLEMS MADE IT FOR YOU TO DO YOUR WORK, TAKE CARE OF THINGS AT HOME, OR GET ALONG WITH OTHER PEOPLE: SOMEWHAT DIFFICULT
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
## 2024-06-05 ENCOUNTER — PATIENT OUTREACH (OUTPATIENT)
Dept: CARE COORDINATION | Facility: CLINIC | Age: 29
End: 2024-06-05
Payer: COMMERCIAL

## 2024-06-05 NOTE — LETTER
[facility] CARE COORDINATION  [address] [facility] [address]    June 6, 2024    Kayy [place]  [address]   [facility] [address]      Dear Kayy,    I am a clinic community health worker who works with Rolando Jain PA-C with the [facility]. I have been trying to reach you recently to introduce Clinic Care Coordination. Below is a description of clinic care coordination and how we can further assist you.       The clinic care coordination team is made up of a registered nurse, , financial resource worker and community health worker who understand the health care system. The goal of clinic care coordination is to help you manage your health and improve access to the health care system. Our team works alongside your provider to assist you in determining your health and social needs. We can help you obtain health care and community resources, providing you with necessary information and education. We can work with you through any barriers and develop a care plan that helps coordinate and strengthen the communication between you and your care team.  Our services are voluntary and are offered without charge to you personally.    Please feel free to contact me with any questions or concerns regarding care coordination and what we can offer.      We are focused on providing you with the highest-quality healthcare experience possible.    Sincerely,     TAYLOR Rios  [phone]  [facility]cted [facility] Center  [facility]

## 2024-06-05 NOTE — PROGRESS NOTES
[facility]/Voicemail    Clinical Data: Care Coordinator Outreach    Outreach Documentation Number of Outreach Attempt   6/5/2024   1:08 PM 1       Left message on patient's voicemail with call back information and requested return call.    Plan:   Care Coordinator will try to reach patient again in 1-2 business days.    TAYLOR Arnold, [place], [place] and [facility]  [phone]

## 2024-06-06 NOTE — PROGRESS NOTES
Clinic Care Coordination Contact  [facility]/Voicemail    Clinical Data: Care Coordinator Outreach    Outreach Documentation Number of Outreach Attempt   6/5/2024   1:08 PM 1   6/6/2024  12:51 PM 2       Left message on patient's voicemail with call back information and requested return call.    Plan: Care Coordinator will send care coordination introduction letter with care coordinator contact information and explanation of care coordination services via [email or web address]. Care Coordinator will do no further outreaches at this time.    TAYLOR Rios  [phone]  [facility] Care Resource [facility]

## 2024-06-11 ENCOUNTER — MYC MEDICAL ADVICE (OUTPATIENT)
Dept: FAMILY MEDICINE | Facility: OTHER | Age: 29
End: 2024-06-11
Payer: COMMERCIAL

## 2024-06-12 ENCOUNTER — NURSE TRIAGE (OUTPATIENT)
Dept: FAMILY MEDICINE | Facility: OTHER | Age: 29
End: 2024-06-12
Payer: COMMERCIAL

## 2024-06-12 NOTE — TELEPHONE ENCOUNTER
See triage 6/12/24    Keri Dacosta RN  [facility] - Registered Nurse  Clinic Triage [facility]   June 12, 2024

## 2024-06-12 NOTE — TELEPHONE ENCOUNTER
SANDRA sent to ED    Nurse Triage SBAR    Is this a 2nd Level Triage? NO    Situation: Patient noticed since 6/8/24 she has had HA, diarrhea, spinal pain, numbness in left arm, blurry vision.  Background: Questioned if was side effects from propanolol  Assessment: Patient stated her sine has pain all the way from neck to buttocks and her left arm numbness. States she has blurry vision that started 6/8 and is on and off a few minutes through out the day. Pudding consistency diarrhea green since 6/8 and no diet changes.   Protocol Recommended Disposition:   Call ADS/Go to ED/UCC Now (Or To Office with PCP Approval)    Recommendation: Recommended to be seen in ED for further imaging and assessment with spine pain, numbness and vision changes. Patient verbalized understanding and all questions answered.     Routed to provider    Does the patient meet one of the following criteria for ADS visit consideration? 16+ years old, with an [facility] PCP     Keri Dacosta RN  [facility] - Registered Nurse  Clinic Triage [facility]   June 12, 2024    Reason for Disposition   Double vision    Additional Information   Negative: Weakness of the face, arm or leg on one side of the body   Negative: Followed getting substance in the eye   Negative: Foreign body stuck in the eye   Negative: Followed an eye injury   Negative: Followed sun lamp or sun exposure (UV keratitis)   Negative: Yellow or green discharge (pus) in the eye   Negative: Pregnant   Negative: Complete loss of vision in one or both eyes   Negative: SEVERE eye pain   Negative: SEVERE headache    Protocols used: Vision Loss or Change-A-OH

## 2024-06-12 NOTE — TELEPHONE ENCOUNTER
RN Triage    Patient Contact    Attempt # 1    Was call answered?  No.  Left message on voicemail with information to call me back.    Erinn ARREDONDON, RN

## 2024-06-13 NOTE — TELEPHONE ENCOUNTER
It appears that the patient did follow recommendation for ED evaluation.     Rolando Jain PA-C on 6/13/2024 at 9:48 AM

## 2024-06-14 ENCOUNTER — NURSE TRIAGE (OUTPATIENT)
Dept: FAMILY MEDICINE | Facility: OTHER | Age: 29
End: 2024-06-14
Payer: COMMERCIAL

## 2024-06-14 NOTE — TELEPHONE ENCOUNTER
Patient was given propranolol for her migraines and she had to stop taking this due to the side effects she was having. She has been off the medication for two days.    She went to the ER on 6/12/24.    She is hoping to get a different medication prescribed.    She is still having the tight chest, and she is getting dizzy to the point of almost passing out.  She states she also has a dry scratchy throat and a dry cough.  No difficulty breathing.  She states she has a constant pressure in her chest that burns.  Current pain in her chest is 8 1/2 out of 10.constant pain.    Per protocol patient advised to go to the ER. Patient agrees with the plan.    Krysta Payan RN on 6/14/2024 at 3:42 PM    Reason for Disposition   SEVERE chest pain    Additional Information   Negative: SEVERE difficulty breathing (e.g., struggling for each breath, speaks in single words)   Negative: Difficult to awaken or acting confused (e.g., disoriented, slurred speech)   Negative: Shock suspected (e.g., cold/pale/clammy skin, too weak to stand, low BP, rapid pulse)   Negative: Passed out (i.e., lost consciousness, collapsed and was not responding)   Negative: Chest pain lasting longer than 5 minutes and ANY of the following:         Pain is crushing, pressure-like, or heavy         Over 44 years old          Over 30 years old and one cardiac risk factor (e.g diabetes, high blood pressure, high cholesterol, smoker, or family history of heart disease)         History of heart disease (e.g. angina, heart attack, heart failure, bypass surgery, takes nitroglycerin)   Negative: Followed an injury to chest   Negative: Sounds like a life-threatening emergency to the triager   Negative: Heart beating < 50 beats per minute OR > 140 beats per minute   Negative: Visible sweat on face or sweat dripping down face    Protocols used: Chest Pain-A-OH

## 2024-06-30 ENCOUNTER — HEALTH MAINTENANCE LETTER (OUTPATIENT)
Age: 29
End: 2024-06-30

## 2024-07-19 ENCOUNTER — TELEPHONE (OUTPATIENT)
Dept: PULMONOLOGY | Facility: CLINIC | Age: 29
End: 2024-07-19
Payer: COMMERCIAL

## 2024-07-19 DIAGNOSIS — R06.02 SOB (SHORTNESS OF BREATH): Primary | ICD-10-CM

## 2024-07-19 NOTE — TELEPHONE ENCOUNTER
[facility] Call Center    Phone Message    May a detailed message be left on voicemail: yes     Reason for Call: Appointment Intake    Referring Provider Name: N/A (self-referral)  Diagnosis and/or Symptoms: Shortness of breath    Pt is scheduled to establish care 11/5/24 with Dr. Mathew, needs orders placed for PFT's (pended).    Action Taken: Other: Pulm    Travel Screening: Not Applicable

## 2024-07-22 ENCOUNTER — TELEPHONE (OUTPATIENT)
Dept: PULMONOLOGY | Facility: CLINIC | Age: 29
End: 2024-07-22
Payer: COMMERCIAL

## 2024-07-22 NOTE — TELEPHONE ENCOUNTER
PFT and CXR orders entered. Message sent to scheduling requesting someone reach out to the pt in order to assist with scheduling a CXR prior to pulm consult.    Catalina Dillard LPN  Pulmonary Medicine:  [facility]  Phone: [phone] Fax: [phone]

## 2024-07-22 NOTE — CONFIDENTIAL NOTE
07/22 Called patient (1st attempt) left voicemail and provided [phone] for patient to call back and schedule chest xray prior to appointment with  on 11/05/2024.       [facility]   Gastroenterology, Infectious Diseases, Nephrology, Pulmonology and Rheumatology Specialties  [facility]

## 2024-07-22 NOTE — TELEPHONE ENCOUNTER
----- Message from MIKHAIL HAIDER sent at 7/22/2024  7:59 AM CDT -----  Regarding: CXR scheduling  Hel, can someone please reach out to the pt and assist with scheduling a CXR prior to seeing Dr. Mathew 11/05? Same day CXR okay. Orders are in. Thanks so much!    Mikhail Dillard LPN  Pulmonary Medicine:  [facility]  Phone: [phone] Fax: [phone]

## 2024-08-16 ENCOUNTER — OFFICE VISIT (OUTPATIENT)
Dept: FAMILY MEDICINE | Facility: OTHER | Age: 29
End: 2024-08-16
Payer: COMMERCIAL

## 2024-08-16 VITALS
HEIGHT: 62 IN | OXYGEN SATURATION: 97 % | HEART RATE: 69 BPM | TEMPERATURE: 97.1 F | BODY MASS INDEX: 33.4 KG/M2 | SYSTOLIC BLOOD PRESSURE: 116 MMHG | DIASTOLIC BLOOD PRESSURE: 68 MMHG | RESPIRATION RATE: 16 BRPM | WEIGHT: 181.5 LBS

## 2024-08-16 DIAGNOSIS — G43.909 MIGRAINE WITHOUT STATUS MIGRAINOSUS, NOT INTRACTABLE, UNSPECIFIED MIGRAINE TYPE: ICD-10-CM

## 2024-08-16 DIAGNOSIS — Z00.00 ROUTINE GENERAL MEDICAL EXAMINATION AT A HEALTH CARE FACILITY: Primary | ICD-10-CM

## 2024-08-16 DIAGNOSIS — Z30.011 ENCOUNTER FOR INITIAL PRESCRIPTION OF CONTRACEPTIVE PILLS: ICD-10-CM

## 2024-08-16 DIAGNOSIS — Z12.4 CERVICAL CANCER SCREENING: ICD-10-CM

## 2024-08-16 DIAGNOSIS — Z13.9 ENCOUNTER FOR SCREENING INVOLVING SOCIAL DETERMINANTS OF HEALTH (SDOH): ICD-10-CM

## 2024-08-16 LAB — HCG UR QL: NEGATIVE

## 2024-08-16 PROCEDURE — 81025 URINE PREGNANCY TEST: CPT | Performed by: PHYSICIAN ASSISTANT

## 2024-08-16 PROCEDURE — 99395 PREV VISIT EST AGE 18-39: CPT | Performed by: PHYSICIAN ASSISTANT

## 2024-08-16 RX ORDER — ALBUTEROL SULFATE 90 UG/1
2 AEROSOL, METERED RESPIRATORY (INHALATION) EVERY 6 HOURS PRN
COMMUNITY
Start: 2024-08-16

## 2024-08-16 RX ORDER — ACETAMINOPHEN AND CODEINE PHOSPHATE 120; 12 MG/5ML; MG/5ML
0.35 SOLUTION ORAL DAILY
Qty: 90 TABLET | Refills: 3 | Status: SHIPPED | OUTPATIENT
Start: 2024-08-16

## 2024-08-16 SDOH — HEALTH STABILITY: PHYSICAL HEALTH: ON AVERAGE, HOW MANY MINUTES DO YOU ENGAGE IN EXERCISE AT THIS LEVEL?: 0 MIN

## 2024-08-16 SDOH — HEALTH STABILITY: PHYSICAL HEALTH: ON AVERAGE, HOW MANY DAYS PER WEEK DO YOU ENGAGE IN MODERATE TO STRENUOUS EXERCISE (LIKE A BRISK WALK)?: 0 DAYS

## 2024-08-16 ASSESSMENT — SOCIAL DETERMINANTS OF HEALTH (SDOH)
HOW OFTEN DO YOU GET TOGETHER WITH FRIENDS OR RELATIVES?: ONCE A WEEK
HOW OFTEN DO YOU GET TOGETHER WITH FRIENDS OR RELATIVES?: ONCE A WEEK

## 2024-08-16 ASSESSMENT — PATIENT HEALTH QUESTIONNAIRE - PHQ9
SUM OF ALL RESPONSES TO PHQ QUESTIONS 1-9: 14
10. IF YOU CHECKED OFF ANY PROBLEMS, HOW DIFFICULT HAVE THESE PROBLEMS MADE IT FOR YOU TO DO YOUR WORK, TAKE CARE OF THINGS AT HOME, OR GET ALONG WITH OTHER PEOPLE: SOMEWHAT DIFFICULT
SUM OF ALL RESPONSES TO PHQ QUESTIONS 1-9: 14

## 2024-08-16 NOTE — PATIENT INSTRUCTIONS
Gabriel, W  [phone]  Connected Care Resource [facility]  Patient Education   Preventive Care Advice   This is general advice given by our system to help you stay healthy. However, your care team may have specific advice just for you. Please talk to your care team about your preventive care needs.  Nutrition  Eat 5 or more servings of fruits and vegetables each day.  Try wheat bread, brown rice and whole grain pasta (instead of white bread, rice, and pasta).  Get enough calcium and vitamin D. Check the label on foods and aim for 100% of the RDA (recommended daily allowance).  Lifestyle  Exercise at least 150 minutes each week  (30 minutes a day, 5 days a week).  Do muscle strengthening activities 2 days a week. These help control your weight and prevent disease.  No smoking.  Wear sunscreen to prevent skin cancer.  Have a dental exam and cleaning every 6 months.  Yearly exams  See your health care team every year to talk about:  Any changes in your health.  Any medicines your care team has prescribed.  Preventive care, family planning, and ways to prevent chronic diseases.  Shots (vaccines)   HPV shots (up to age 26), if you've never had them before.  Hepatitis B shots (up to age 59), if you've never had them before.  COVID-19 shot: Get this shot when it's due.  Flu shot: Get a flu shot every year.  Tetanus shot: Get a tetanus shot every 10 years.  Pneumococcal, hepatitis A, and RSV shots: Ask your care team if you need these based on your risk.  Shingles shot (for age 50 and up)  General health tests  Diabetes screening:  Starting at age 35, Get screened for diabetes at least every 3 years.  If you are younger than age 35, ask your care team if you should be screened for diabetes.  Cholesterol test: At age 39, start having a cholesterol test every 5 years, or more often if advised.  Bone density scan (DEXA): At age 50, ask your care team if you should have this scan for osteoporosis (brittle  bones).  Hepatitis C: Get tested at least once in your life.  STIs (sexually transmitted infections)  Before age 24: Ask your care team if you should be screened for STIs.  After age 24: Get screened for STIs if you're at risk. You are at risk for STIs (including HIV) if:  You are sexually active with more than one person.  You don't use condoms every time.  You or a partner was diagnosed with a sexually transmitted infection.  If you are at risk for HIV, ask about PrEP medicine to prevent HIV.  Get tested for HIV at least once in your life, whether you are at risk for HIV or not.  Cancer screening tests  Cervical cancer screening: If you have a cervix, begin getting regular cervical cancer screening tests starting at age 21.  Breast cancer scan (mammogram): If you've ever had breasts, begin having regular mammograms starting at age 40. This is a scan to check for breast cancer.  Colon cancer screening: It is important to start screening for colon cancer at age 45.  Have a colonoscopy test every 10 years (or more often if you're at risk) Or, ask your provider about stool tests like a FIT test every year or Cologuard test every 3 years.  To learn more about your testing options, visit:   .  For help making a decision, visit:   https://bit.ly/tw10677.  Prostate cancer screening test: If you have a prostate, ask your care team if a prostate cancer screening test (PSA) at age 55 is right for you.  Lung cancer screening: If you are a current or former smoker ages 50 to 80, ask your care team if ongoing lung cancer screenings are right for you.  For informational purposes only. Not to replace the advice of your health care provider. Copyright   2023 [employer]. All rights reserved. Clinically reviewed by the [facility]. [employer] [id number] - REV 01/24.  Learning About Stress  What is stress?     Stress is your body's response to a hard situation. Your body can have a physical,  emotional, or mental response. Stress is a fact of life for most people, and it affects everyone differently. What causes stress for you may not be stressful for someone else.  A lot of things can cause stress. You may feel stress when you go on a job interview, take a test, or run a race. This kind of short-term stress is normal and even useful. It can help you if you need to work hard or react quickly. For example, stress can help you finish an important job on time.  Long-term stress is caused by ongoing stressful situations or events. Examples of long-term stress include long-term health problems, ongoing problems at work, or conflicts in your family. Long-term stress can harm your health.  How does stress affect your health?  When you are stressed, your body responds as though you are in danger. It makes hormones that speed up your heart, make you breathe faster, and give you a burst of energy. This is called the fight-or-flight stress response. If the stress is over quickly, your body goes back to normal and no harm is done.  But if stress happens too often or lasts too long, it can have bad effects. Long-term stress can make you more likely to get sick, and it can make symptoms of some diseases worse. If you tense up when you are stressed, you may develop neck, shoulder, or low back pain. Stress is linked to high blood pressure and heart disease.  Stress also harms your emotional health. It can make you felder, tense, or depressed. Your relationships may suffer, and you may not do well at work or school.  What can you do to manage stress?  You can try these things to help manage stress:   Do something active. Exercise or activity can help reduce stress. Walking is a great way to get started. Even everyday activities such as housecleaning or yard work can help.  Try yoga or moe chi. These techniques combine exercise and meditation. You may need some training at first to learn them.  Do something you enjoy. For  "example, listen to music or go to a movie. Practice your hobby or do volunteer work.  Meditate. This can help you relax, because you are not worrying about what happened before or what may happen in the future.  Do guided imagery. Imagine yourself in any setting that helps you feel calm. You can use online videos, books, or a teacher to guide you.  Do breathing exercises. For example:  From a standing position, bend forward from the waist with your knees slightly bent. Let your arms dangle close to the floor.  Breathe in slowly and deeply as you return to a standing position. Roll up slowly and lift your head last.  Hold your breath for just a few seconds in the standing position.  Breathe out slowly and bend forward from the waist.  Let your feelings out. Talk, laugh, cry, and express anger when you need to. Talking with supportive friends or family, a counselor, or a tato leader about your feelings is a healthy way to relieve stress. Avoid discussing your feelings with people who make you feel worse.  Write. It may help to write about things that are bothering you. This helps you find out how much stress you feel and what is causing it. When you know this, you can find better ways to cope.  What can you do to prevent stress?  You might try some of these things to help prevent stress:  Manage your time. This helps you find time to do the things you want and need to do.  Get enough sleep. Your body recovers from the stresses of the day while you are sleeping.  Get support. Your family, friends, and community can make a difference in how you experience stress.  Limit your news feed. Avoid or limit time on social media or news that may make you feel stressed.  Do something active. Exercise or activity can help reduce stress. Walking is a great way to get started.  Where can you learn more?  Go to https://www.healthwise.net/patiented  Enter N032 in the search box to learn more about \"Learning About Stress.\"  Current " as of: October 24, 2023               Content Version: 14.0    9786-0189 [employer].   Care instructions adapted under license by your healthcare professional. If you have questions about a medical condition or this instruction, always ask your healthcare professional. [employer] disclaims any warranty or liability for your use of this information.      Learning About Depression Screening  What is depression screening?  Depression screening is a way to see if you have depression symptoms. It may be done by a doctor or counselor. It's often part of a routine checkup. That's because your mental health is just as important as your physical health.  Depression is a mental health condition that affects how you feel, think, and act. You may:  Have less energy.  Lose interest in your daily activities.  Feel sad and grouchy for a long time.  Depression is very common. It affects people of all ages.  Many things can lead to depression. Some people become depressed after they have a stroke or find out they have a major illness like cancer or heart disease. The death of a loved one or a breakup may lead to depression. It can run in families. Most experts believe that a combination of inherited genes and stressful life events can cause it.  What happens during screening?  You may be asked to fill out a form about your depression symptoms. You and the doctor will discuss your answers. The doctor may ask you more questions to learn more about how you think, act, and feel.  What happens after screening?  If you have symptoms of depression, your doctor will talk to you about your options.  Doctors usually treat depression with medicines or counseling. Often, combining the two works best. Many people don't get help because they think that they'll get over the depression on their own. But people with depression may not get better unless they get treatment.  The cause of depression is not well understood.  "There may be many factors involved. But if you have depression, it's not your fault.  A serious symptom of depression is thinking about death or suicide. If you or someone you care about talks about this or about feeling hopeless, get help right away.  It's important to know that depression can be treated. Medicine, counseling, and self-care may help.  Where can you learn more?  Go to https://www.Well.ca.net/patiented  Enter T185 in the search box to learn more about \"Learning About Depression Screening.\"  Current as of: June 24, 2023  Content Version: 14.1    4490-1349 [employer].   Care instructions adapted under license by your healthcare professional. If you have questions about a medical condition or this instruction, always ask your healthcare professional. [employer] disclaims any warranty or liability for your use of this information.       "
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
## 2024-09-03 DIAGNOSIS — G43.909 MIGRAINE WITHOUT STATUS MIGRAINOSUS, NOT INTRACTABLE, UNSPECIFIED MIGRAINE TYPE: ICD-10-CM

## 2024-09-04 RX ORDER — RIMEGEPANT SULFATE 75 MG/75MG
TABLET, ORALLY DISINTEGRATING ORAL
Qty: 8 TABLET | Refills: 2 | Status: SHIPPED | OUTPATIENT
Start: 2024-09-04

## 2024-09-05 ENCOUNTER — TELEPHONE (OUTPATIENT)
Dept: FAMILY MEDICINE | Facility: OTHER | Age: 29
End: 2024-09-05
Payer: COMMERCIAL

## 2024-09-05 NOTE — TELEPHONE ENCOUNTER
rimegepant (NURTEC) 75 MG ODT tablet     How many headaches /month ? Needed for insurance audits . Thanks

## 2024-09-06 ENCOUNTER — TELEPHONE (OUTPATIENT)
Dept: FAMILY MEDICINE | Facility: OTHER | Age: 29
End: 2024-09-06
Payer: COMMERCIAL

## 2024-09-06 NOTE — TELEPHONE ENCOUNTER
Patient called back and was informed we dont do spinal tabs.  She would like to keep the appointment and discuss MS.

## 2024-09-06 NOTE — TELEPHONE ENCOUNTER
"I tried calling patient, had to [facility], to ask her about her appt upcoming with [facility] on 09/27 because it says \"I need to get a spinal tap test for MS via mychart requests\" so I'm trying to clarify if she is asking to have a spinal tap test/lumbar puncture that day or if she is trying to have a pre-op type appt for a lumbar puncture test done with us because we don't do spinal taps here at [place].    Sherron Dejesus, [facility]  "

## 2024-09-06 NOTE — TELEPHONE ENCOUNTER
RN Triage    Patient Contact    Attempt # 1    RN did attempt to reach patient . No answer. Message left for patient to call the clinic back and ask to speak to a triage nurse.       Calling to clarify       rimegepant (NURTEC) 75 MG ODT tablet      How many headaches /month ? Needed for insurance audits         Alley Hernandez RN on 9/6/2024 at 1:14 PM

## 2024-09-09 NOTE — TELEPHONE ENCOUNTER
RN Triage    Patient Contact    Attempt # 2    RN did attempt to reach patient . No answer. Message left for patient to call the clinic back and ask to speak to a triage nurse.     Alley Hernandez RN on 9/9/2024 at 9:33 AM

## 2024-09-10 NOTE — TELEPHONE ENCOUNTER
RN Triage    Patient Contact    Attempt # 3    RN did attempt to reach patient. No answer. Message left for patient to call the clinic back and ask to speak to a triage nurse. Wanting to clarify how many headaches she has per month. Also sent a mychart encounter with question.     Loren Shipman RN on 9/10/2024 at 10:36 AM

## 2024-09-27 ENCOUNTER — OFFICE VISIT (OUTPATIENT)
Dept: FAMILY MEDICINE | Facility: OTHER | Age: 29
End: 2024-09-27
Payer: COMMERCIAL

## 2024-09-27 DIAGNOSIS — Z11.3 SCREENING EXAMINATION FOR STI: ICD-10-CM

## 2024-09-27 DIAGNOSIS — Z12.4 CERVICAL CANCER SCREENING: Primary | ICD-10-CM

## 2024-09-27 PROCEDURE — 87624 HPV HI-RISK TYP POOLED RSLT: CPT | Performed by: PHYSICIAN ASSISTANT

## 2024-09-27 PROCEDURE — G0145 SCR C/V CYTO,THINLAYER,RESCR: HCPCS | Performed by: PHYSICIAN ASSISTANT

## 2024-09-27 PROCEDURE — 87210 SMEAR WET MOUNT SALINE/INK: CPT | Performed by: PHYSICIAN ASSISTANT

## 2024-09-27 PROCEDURE — 99214 OFFICE O/P EST MOD 30 MIN: CPT | Performed by: PHYSICIAN ASSISTANT

## 2024-09-29 VITALS — HEIGHT: 62 IN | BODY MASS INDEX: 33.68 KG/M2 | WEIGHT: 183 LBS
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
## 2024-09-30 ENCOUNTER — NURSE TRIAGE (OUTPATIENT)
Dept: FAMILY MEDICINE | Facility: OTHER | Age: 29
End: 2024-09-30
Payer: COMMERCIAL

## 2024-09-30 LAB
HPV HR 12 DNA CVX QL NAA+PROBE: NEGATIVE
HPV16 DNA CVX QL NAA+PROBE: NEGATIVE
HPV18 DNA CVX QL NAA+PROBE: NEGATIVE
HUMAN PAPILLOMA VIRUS FINAL DIAGNOSIS: NORMAL

## 2024-09-30 NOTE — TELEPHONE ENCOUNTER
SANDRA sent to   Nurse Triage SBAR    Is this a 2nd Level Triage? YES, LICENSED PRACTITIONER REVIEW IS REQUIRED    Situation:Had pelvic exam 9/27    Background: mild irritating urination since then but getting better, with mild intermittent right flank pain.    Assessment: Patient has mild urination pain with right flank mild intermittent pain. Denies SOB, frequency, blood/pus in urine, foul odor, discolored urine or cloudy, N&V, fever, chills, abd pain.   Protocol Recommended Disposition:   See PCP Within 24 Hours  With flank pain sent to   Recommendation: To be seen in  to r/o UTI vs. [place]o vs kidney stone. Patient verbalized understanding and all questions answered.     Routed to provider    Does the patient meet one of the following criteria for ADS visit consideration? 16+ years old, with an [facility] PCP     Keri Dacosta [facility] - Registered Nurse  Clinic Triage [place]   September 30, 2024    Reason for Disposition   Side (flank) or lower back pain present    Additional Information   Negative: Shock suspected (e.g., cold/pale/clammy skin, too weak to stand, low BP, rapid pulse)   Negative: Sounds like a life-threatening emergency to the triager   Negative: Followed a female genital area injury (e.g., labia, vagina, vulva)   Negative: Followed a male genital area injury (e.g., penis, scrotum)   Negative: Vaginal discharge   Negative: Pus (white, yellow) or bloody discharge from end of penis   Negative: [1] Taking antibiotic for urinary tract infection (UTI) AND [2] female   Negative: [1] Taking antibiotic for urinary tract infection (UTI) AND [2] male   Negative: [1] Pain or burning with passing urine (urination) AND [2] pregnant   Negative: [1] Pain or burning with passing urine (urination) AND [2] postpartum (from 0 to 6 weeks after delivery)   Negative: [1] Pain or burning with passing urine (urination) AND [2] female   Negative: [1] Pain or burning with passing urine (urination) AND [2] male    "Negative: Pain or itching in the vulvar area   Negative: Pain in scrotum is main symptom   Negative: Blood in the urine is main symptom   Negative: Symptoms arising from use of a urinary catheter (e.g., Coude, Michaud)   Negative: [1] Unable to urinate (or only a few drops) > 4 hours AND [2] bladder feels very full (e.g., palpable bladder or strong urge to urinate)   Negative: [1] Decreased urination and [2] drinking very little AND [2] dehydration suspected (e.g., dark urine, no urine > 12 hours, very dry mouth, very lightheaded)   Negative: Patient sounds very sick or weak to the triager   Negative: Fever > 100.4 F (38.0 C)    Answer Assessment - Initial Assessment Questions  1. SYMPTOM: \"What's the main symptom you're concerned about?\" (e.g., frequency, incontinence)  Pain mild with urinating  2. ONSET: \"When did the  *No Answer*  start?\"  9/28  3. PAIN: \"Is there any pain?\" If Yes, ask: \"How bad is it?\" (Scale: 1-10; mild, moderate, severe)  mild  4. CAUSE: \"What do you think is causing the symptoms?\"  Uti?  5. OTHER SYMPTOMS: \"Do you have any other symptoms?\" (e.g., blood in urine, fever, flank pain, pain with urination)  Right flank intermittent pain  6. PREGNANCY: \"Is there any chance you are pregnant?\" \"When was your last menstrual period?\"      unknown    Protocols used: Urinary Symptoms-A-AH    "

## 2024-10-03 LAB
BKR AP ASSOCIATED HPV REPORT: NORMAL
BKR LAB AP GYN ADEQUACY: NORMAL
BKR LAB AP GYN INTERPRETATION: NORMAL
BKR LAB AP PREVIOUS ABNORMAL: NORMAL
PATH REPORT.COMMENTS IMP SPEC: NORMAL
PATH REPORT.COMMENTS IMP SPEC: NORMAL
PATH REPORT.RELEVANT HX SPEC: NORMAL

## 2024-10-24 ENCOUNTER — MYC MEDICAL ADVICE (OUTPATIENT)
Dept: FAMILY MEDICINE | Facility: OTHER | Age: 29
End: 2024-10-24
Payer: COMMERCIAL

## 2024-10-24 NOTE — TELEPHONE ENCOUNTER
Patient started taking    Disp Refills Start End SHANTANU   norethindrone (MICRONOR) 0.35 MG tablet 90 tablet 3 8/16/2024 -- No   Sig - Route: Take 1 tablet (0.35 mg) by mouth daily - Oral   Sent to pharmacy as: Norethindrone 0.35 MG Oral Tablet (MICRONOR)     8/16/24  Any further recommendations?    Ewelina Hernandez, RN, BSN

## 2024-10-25 ENCOUNTER — TELEPHONE (OUTPATIENT)
Dept: NEUROLOGY | Facility: CLINIC | Age: 29
End: 2024-10-25
Payer: COMMERCIAL

## 2024-10-25 DIAGNOSIS — R41.9 COGNITIVE COMPLAINTS: Primary | ICD-10-CM

## 2024-10-25 NOTE — TELEPHONE ENCOUNTER
The [facility] Adult Neuropsychology clinic has had to limit the number of referrals we can accept. Until further notice, we recommend all patients under the age of 60 who do not have an identified or suspected neurological condition be referred to external neuropsychologists. We apologize for the inconvenience.    There are several community neuropsychologists that we might suggest, including:  Dr. Jayla Tejeda - [phone]  Dr. Ashutosh Rodriguez - [phone]   Dr. Melanie Rodriguez - [phone]   Dr. Janina Overton - [phone]   Dr. Lisset Peck - [phone]   [facility] - [phone], [email or web address]  Dr. Mauri Marie - [phone], [email or web address]   [facility] - [phone], [email or web address]   [facility], [email or web address]  [facility] - [phone], [email or web address]     Sincerely,  Pillo Severino   Psychometrist

## 2024-11-01 ENCOUNTER — MYC MEDICAL ADVICE (OUTPATIENT)
Dept: FAMILY MEDICINE | Facility: OTHER | Age: 29
End: 2024-11-01

## 2024-11-05 ENCOUNTER — OFFICE VISIT (OUTPATIENT)
Dept: NURSING | Facility: CLINIC | Age: 29
End: 2024-11-05
Payer: MEDICAID

## 2024-11-05 VITALS — HEART RATE: 70 BPM | OXYGEN SATURATION: 100 % | BODY MASS INDEX: 33.33 KG/M2 | WEIGHT: 184.6 LBS

## 2024-11-05 DIAGNOSIS — R06.02 SOB (SHORTNESS OF BREATH): ICD-10-CM

## 2024-11-05 PROCEDURE — 94729 DIFFUSING CAPACITY: CPT | Performed by: INTERNAL MEDICINE

## 2024-11-05 PROCEDURE — 94060 EVALUATION OF WHEEZING: CPT | Performed by: INTERNAL MEDICINE

## 2024-11-05 PROCEDURE — 94726 PLETHYSMOGRAPHY LUNG VOLUMES: CPT | Performed by: INTERNAL MEDICINE

## 2024-11-06 LAB
DLCOUNC-%PRED-PRE: 106 %
DLCOUNC-PRE: 22.26 ML/MIN/MMHG
DLCOUNC-PRED: 20.95 ML/MIN/MMHG
ERV-%PRED-PRE: 56 %
ERV-PRE: 0.66 L
ERV-PRED: 1.17 L
EXPTIME-PRE: 4.82 SEC
FEF2575-%PRED-POST: 111 %
FEF2575-%PRED-PRE: 87 %
FEF2575-POST: 3.6 L/SEC
FEF2575-PRE: 2.81 L/SEC
FEF2575-PRED: 3.23 L/SEC
FEFMAX-%PRED-PRE: 62 %
FEFMAX-PRE: 4.19 L/SEC
FEFMAX-PRED: 6.7 L/SEC
FEV1-%PRED-PRE: 100 %
FEV1-PRE: 2.82 L
FEV1FEV6-PRE: 80 %
FEV1FEV6-PRED: 85 %
FEV1FVC-PRE: 80 %
FEV1FVC-PRED: 86 %
FEV1SVC-PRE: 82 %
FEV1SVC-PRED: 81 %
FIFMAX-PRE: 2.6 L/SEC
FRCPLETH-%PRED-PRE: 85 %
FRCPLETH-PRE: 2.18 L
FRCPLETH-PRED: 2.56 L
FVC-%PRED-PRE: 108 %
FVC-PRE: 3.51 L
FVC-PRED: 3.25 L
IC-%PRED-PRE: 118 %
IC-PRE: 2.76 L
IC-PRED: 2.33 L
RVPLETH-%PRED-PRE: 113 %
RVPLETH-PRE: 1.52 L
RVPLETH-PRED: 1.34 L
TLCPLETH-%PRED-PRE: 107 %
TLCPLETH-PRE: 4.94 L
TLCPLETH-PRED: 4.6 L
VA-%PRED-PRE: 102 %
VA-PRE: 4.63 L
VC-%PRED-PRE: 99 %
VC-PRE: 3.41 L
VC-PRED: 3.44 L

## 2024-12-06 NOTE — TELEPHONE ENCOUNTER
REASON FOR VISIT: Migraine without status migrainosus, not intractable, unspecified migraine type    DATE OF APPT: 3/12/2025   NOTES (FOR ALL VISITS) STATUS DETAILS   OFFICE NOTE from referring provider [facility]  Rolando Jain PA-C 6/04/2024   MEDICATION LIST N/A    IMAGING  (FOR ALL VISITS)     XR N/A    MRI (HEAD, NECK, SPINE) N/A    CT (HEAD, NECK, SPINE) N/A

## 2024-12-18 NOTE — TELEPHONE ENCOUNTER
Patient calling in asking if referral can be faxed to [facility] at [phone].  They didn't receive previous fax.  Referral faxed.

## 2024-12-19 ENCOUNTER — MYC MEDICAL ADVICE (OUTPATIENT)
Dept: FAMILY MEDICINE | Facility: OTHER | Age: 29
End: 2024-12-19
Payer: MEDICAID

## 2024-12-19 ENCOUNTER — TELEPHONE (OUTPATIENT)
Dept: FAMILY MEDICINE | Facility: OTHER | Age: 29
End: 2024-12-19
Payer: MEDICAID

## 2024-12-19 NOTE — TELEPHONE ENCOUNTER
Order/Referral Request    Who is requesting: Patient    Orders being requested: Neuropsychology      Reason service is needed/diagnosis: Follow up on brain injury from 10 years ago.    When are orders needed by: ASAP    Has this been discussed with Provider: Yes    Does patient have a preference on a Group/Provider/Facility? Near [place] area.    Does patient have an appointment scheduled?: No    Where to send orders: Fax [phone]    Could we send this information to you in [place] or would you prefer to receive a phone call?:   Patient would prefer a phone call   Okay to leave a detailed message?: Yes at Home number on file [phone] (home)

## 2024-12-23 ENCOUNTER — MYC MEDICAL ADVICE (OUTPATIENT)
Dept: FAMILY MEDICINE | Facility: OTHER | Age: 29
End: 2024-12-23
Payer: MEDICAID

## 2024-12-23 NOTE — TELEPHONE ENCOUNTER
[facility] requesting office notes to be faxed to them along with referral.  Office notes from 9/27/24 and referral faxed to [phone].

## 2025-03-07 NOTE — NURSING NOTE
Stable BMI 28.11.   Type 2 diabetes without obesity associated with hyperlipidemia microalbuminuria and cataracts with improving control and now within goal.  A1c 6.9%.  Low HDL with elevated triglycerides.  Worsening microalbuminuria.  She is unable to take Farxiga due to recurrent urinary tract infections.  She is on maximal doses of Ozempic.  Recommended that she see a nephrologist and I referred her to Dr. Jennifer Nuñez.  I also recommended that she follow-up with her hematologist Dr. Rosemary Fraser for her biclonal gammopathy and stable anemia.   I also recommended that she eat healthier, lose some weight and go for daily walk.  I recommended a return appointment in 4 months and recheck fasting blood work at that time.       She had an eye exam with cataracts and without retinopathy on 3/29/2023 by Dr. Evie Mcbride.  To get a copy of her recent exam     Chief Complaint   Patient presents with     Frequency

## 2025-03-12 ENCOUNTER — PRE VISIT (OUTPATIENT)
Dept: NEUROLOGY | Facility: CLINIC | Age: 30
End: 2025-03-12

## 2025-06-09 DIAGNOSIS — Z30.011 ENCOUNTER FOR INITIAL PRESCRIPTION OF CONTRACEPTIVE PILLS: ICD-10-CM

## 2025-06-09 RX ORDER — ACETAMINOPHEN AND CODEINE PHOSPHATE 120; 12 MG/5ML; MG/5ML
0.35 SOLUTION ORAL DAILY
Qty: 90 TABLET | Refills: 0 | Status: SHIPPED | OUTPATIENT
Start: 2025-06-09
